# Patient Record
Sex: MALE | Race: WHITE | NOT HISPANIC OR LATINO | Employment: OTHER | URBAN - METROPOLITAN AREA
[De-identification: names, ages, dates, MRNs, and addresses within clinical notes are randomized per-mention and may not be internally consistent; named-entity substitution may affect disease eponyms.]

---

## 2017-02-21 DIAGNOSIS — I10 BENIGN ESSENTIAL HYPERTENSION: ICD-10-CM

## 2017-02-21 RX ORDER — ACEBUTOLOL HYDROCHLORIDE 200 MG/1
200 CAPSULE ORAL EVERY 12 HOURS
Qty: 30 CAPSULE | Refills: 0 | Status: SHIPPED | OUTPATIENT
Start: 2017-02-21 | End: 2017-05-16 | Stop reason: SDUPTHER

## 2017-05-12 DIAGNOSIS — R53.81 MALAISE AND FATIGUE: ICD-10-CM

## 2017-05-12 DIAGNOSIS — Z00.00 HEALTHCARE MAINTENANCE: Primary | ICD-10-CM

## 2017-05-12 DIAGNOSIS — R53.83 MALAISE AND FATIGUE: ICD-10-CM

## 2017-05-12 DIAGNOSIS — E78.5 HYPERLIPIDEMIA, UNSPECIFIED HYPERLIPIDEMIA TYPE: ICD-10-CM

## 2017-05-12 LAB
ALBUMIN SERPL-MCNC: 4.7 G/DL (ref 3.5–5.2)
ALBUMIN/GLOB SERPL: 1.8 G/DL
ALP SERPL-CCNC: 83 U/L (ref 39–117)
ALT SERPL-CCNC: 49 U/L (ref 1–41)
AST SERPL-CCNC: 26 U/L (ref 1–40)
BASOPHILS # BLD AUTO: 0.03 10*3/MM3 (ref 0–0.2)
BASOPHILS NFR BLD AUTO: 0.5 % (ref 0–1.5)
BILIRUB SERPL-MCNC: 1.4 MG/DL (ref 0.1–1.2)
BUN SERPL-MCNC: 18 MG/DL (ref 8–23)
BUN/CREAT SERPL: 18 (ref 7–25)
CALCIUM SERPL-MCNC: 10.1 MG/DL (ref 8.6–10.5)
CHLORIDE SERPL-SCNC: 100 MMOL/L (ref 98–107)
CHOLEST SERPL-MCNC: 197 MG/DL (ref 0–200)
CO2 SERPL-SCNC: 29.1 MMOL/L (ref 22–29)
CREAT SERPL-MCNC: 1 MG/DL (ref 0.76–1.27)
EOSINOPHIL # BLD AUTO: 0.17 10*3/MM3 (ref 0–0.7)
EOSINOPHIL NFR BLD AUTO: 2.6 % (ref 0.3–6.2)
ERYTHROCYTE [DISTWIDTH] IN BLOOD BY AUTOMATED COUNT: 14.6 % (ref 11.5–14.5)
GLOBULIN SER CALC-MCNC: 2.6 GM/DL
GLUCOSE SERPL-MCNC: 101 MG/DL (ref 65–99)
HCT VFR BLD AUTO: 46.9 % (ref 40.4–52.2)
HDLC SERPL-MCNC: 40 MG/DL (ref 40–60)
HGB BLD-MCNC: 16.2 G/DL (ref 13.7–17.6)
IMM GRANULOCYTES # BLD: 0.05 10*3/MM3 (ref 0–0.03)
IMM GRANULOCYTES NFR BLD: 0.8 % (ref 0–0.5)
LDLC SERPL CALC-MCNC: 143 MG/DL (ref 0–100)
LDLC/HDLC SERPL: 3.58 {RATIO}
LYMPHOCYTES # BLD AUTO: 1.36 10*3/MM3 (ref 0.9–4.8)
LYMPHOCYTES NFR BLD AUTO: 20.6 % (ref 19.6–45.3)
MCH RBC QN AUTO: 32.2 PG (ref 27–32.7)
MCHC RBC AUTO-ENTMCNC: 34.5 G/DL (ref 32.6–36.4)
MCV RBC AUTO: 93.2 FL (ref 79.8–96.2)
MONOCYTES # BLD AUTO: 0.53 10*3/MM3 (ref 0.2–1.2)
MONOCYTES NFR BLD AUTO: 8 % (ref 5–12)
NEUTROPHILS # BLD AUTO: 4.47 10*3/MM3 (ref 1.9–8.1)
NEUTROPHILS NFR BLD AUTO: 67.5 % (ref 42.7–76)
PLATELET # BLD AUTO: 266 10*3/MM3 (ref 140–500)
POTASSIUM SERPL-SCNC: 4.2 MMOL/L (ref 3.5–5.2)
PROT SERPL-MCNC: 7.3 G/DL (ref 6–8.5)
RBC # BLD AUTO: 5.03 10*6/MM3 (ref 4.6–6)
SODIUM SERPL-SCNC: 142 MMOL/L (ref 136–145)
TRIGL SERPL-MCNC: 70 MG/DL (ref 0–150)
TSH SERPL DL<=0.005 MIU/L-ACNC: 2.6 MIU/ML (ref 0.27–4.2)
VLDLC SERPL CALC-MCNC: 14 MG/DL (ref 5–40)
WBC # BLD AUTO: 6.61 10*3/MM3 (ref 4.5–10.7)

## 2017-05-16 ENCOUNTER — OFFICE VISIT (OUTPATIENT)
Dept: FAMILY MEDICINE CLINIC | Facility: CLINIC | Age: 62
End: 2017-05-16

## 2017-05-16 VITALS
SYSTOLIC BLOOD PRESSURE: 126 MMHG | HEART RATE: 63 BPM | BODY MASS INDEX: 24.38 KG/M2 | WEIGHT: 180 LBS | DIASTOLIC BLOOD PRESSURE: 82 MMHG | OXYGEN SATURATION: 99 % | HEIGHT: 72 IN

## 2017-05-16 DIAGNOSIS — G47.09 OTHER INSOMNIA: ICD-10-CM

## 2017-05-16 DIAGNOSIS — I10 BENIGN ESSENTIAL HYPERTENSION: ICD-10-CM

## 2017-05-16 DIAGNOSIS — Z00.00 HEALTHCARE MAINTENANCE: Primary | ICD-10-CM

## 2017-05-16 DIAGNOSIS — B35.1 ONYCHOMYCOSIS: ICD-10-CM

## 2017-05-16 DIAGNOSIS — M25.832 ULNAR ABUTMENT SYNDROME OF LEFT WRIST: ICD-10-CM

## 2017-05-16 DIAGNOSIS — E78.2 MIXED HYPERLIPIDEMIA: ICD-10-CM

## 2017-05-16 PROCEDURE — 99396 PREV VISIT EST AGE 40-64: CPT | Performed by: FAMILY MEDICINE

## 2017-05-16 RX ORDER — INDAPAMIDE 1.25 MG/1
1.25 TABLET, FILM COATED ORAL DAILY
Qty: 90 TABLET | Refills: 1 | Status: SHIPPED | OUTPATIENT
Start: 2017-05-16 | End: 2017-08-15 | Stop reason: SDUPTHER

## 2017-05-16 RX ORDER — ACEBUTOLOL HYDROCHLORIDE 200 MG/1
200 CAPSULE ORAL EVERY 12 HOURS
Qty: 90 CAPSULE | Refills: 3 | Status: SHIPPED | OUTPATIENT
Start: 2017-05-16 | End: 2017-08-15 | Stop reason: SDUPTHER

## 2017-05-16 RX ORDER — METAXALONE 800 MG/1
800 TABLET ORAL 3 TIMES DAILY
Qty: 90 TABLET | Refills: 3 | Status: SHIPPED | OUTPATIENT
Start: 2017-05-16 | End: 2019-02-11 | Stop reason: SDUPTHER

## 2017-05-17 LAB
Lab: NORMAL
PSA SERPL-MCNC: 0.99 NG/ML (ref 0–4)
SPECIMEN STATUS: NORMAL

## 2017-05-18 LAB — HCV AB S/CO SERPL IA: <0.1 S/CO RATIO (ref 0–0.9)

## 2017-05-23 ENCOUNTER — RESULTS ENCOUNTER (OUTPATIENT)
Dept: FAMILY MEDICINE CLINIC | Facility: CLINIC | Age: 62
End: 2017-05-23

## 2017-05-23 DIAGNOSIS — E78.2 MIXED HYPERLIPIDEMIA: ICD-10-CM

## 2017-08-11 LAB
CHOLEST SERPL-MCNC: 174 MG/DL (ref 0–200)
HDLC SERPL-MCNC: 43 MG/DL (ref 40–60)
LDLC SERPL CALC-MCNC: 119 MG/DL (ref 0–100)
LDLC/HDLC SERPL: 2.76 {RATIO}
TRIGL SERPL-MCNC: 62 MG/DL (ref 0–150)
VLDLC SERPL CALC-MCNC: 12.4 MG/DL (ref 5–40)

## 2017-08-15 ENCOUNTER — OFFICE VISIT (OUTPATIENT)
Dept: FAMILY MEDICINE CLINIC | Facility: CLINIC | Age: 62
End: 2017-08-15

## 2017-08-15 VITALS
BODY MASS INDEX: 22.75 KG/M2 | HEART RATE: 65 BPM | SYSTOLIC BLOOD PRESSURE: 132 MMHG | WEIGHT: 168 LBS | HEIGHT: 72 IN | OXYGEN SATURATION: 99 % | DIASTOLIC BLOOD PRESSURE: 86 MMHG

## 2017-08-15 DIAGNOSIS — E78.2 MIXED HYPERLIPIDEMIA: ICD-10-CM

## 2017-08-15 DIAGNOSIS — I10 BENIGN ESSENTIAL HYPERTENSION: Primary | ICD-10-CM

## 2017-08-15 PROCEDURE — 99213 OFFICE O/P EST LOW 20 MIN: CPT | Performed by: FAMILY MEDICINE

## 2017-08-15 RX ORDER — INDAPAMIDE 1.25 MG/1
1.25 TABLET, FILM COATED ORAL DAILY
Qty: 90 TABLET | Refills: 1 | Status: SHIPPED | OUTPATIENT
Start: 2017-08-15 | End: 2019-01-07 | Stop reason: SDUPTHER

## 2017-08-15 RX ORDER — ACEBUTOLOL HYDROCHLORIDE 200 MG/1
200 CAPSULE ORAL EVERY 12 HOURS
Qty: 90 CAPSULE | Refills: 3 | Status: SHIPPED | OUTPATIENT
Start: 2017-08-15 | End: 2019-04-01 | Stop reason: SDUPTHER

## 2017-08-15 NOTE — PROGRESS NOTES
Emery Kincaid is a 61 y.o. male.  Seen 08/15/2017    Assessment/Plan   Problem List Items Addressed This Visit        Cardiovascular and Mediastinum    Hyperlipidemia    Overview     [facility] 8/15/2017  He has done a great job of getting his BP and lipids under control. [facility] 5/16/2017  Patient LDL has gone up and HDL has decreased . He really would prefer to avoid a statin but his CAD/CVA risk is 22% over the next ten years. He will increase his dietary changes and exercise more and recheck in three months.          Benign essential hypertension - Primary    Overview     [facility] 08/15/17 BP is controlled well. He will recheck in six months. He will continue present meds.           Relevant Medications    acebutolol (SECTRAL) 200 MG capsule    indapamide (LOZOL) 1.25 MG tablet             Return in about 6 months (around 2/15/2018).  Patient Instructions   Agree with waiting on surgery      Subjective     Chief Complaint   Patient presents with   • Hyperlipidemia     discuss labs      Social History   Substance Use Topics   • Smoking status: Never Smoker   • Smokeless tobacco: Never Used   • Alcohol use Yes      Comment: 1-2 per day       History of Present Illness     Patient is here for follow-up of hypertension. He is exercising and is adherent to a low-salt diet. Patient does BP checks at home: It is well controlled when checked. and home BP readings are in the 110's/70's range. Patient denies chest pain and dyspnea. Cardiovascular risk factors: advanced age (older than 55 for men, 65 for women), dyslipidemia, hypertension and male gender. Use of agents associated with hypertension: none. History of target organ damage: none. He is compliant with meds.      He also continues to have pain and numbness in his left arm. He has been dx'd with cubital impingement and surgery recommended but he would like to try some exercising and wait on that.     The following portions of the patient's history were reviewed  "and updated as appropriate:PMHroutine: Social history , Allergies, Current Medications, Active Problem List and Health Maintenance    Review of Systems   Constitutional: Negative for activity change, appetite change, chills, fatigue, fever and unexpected weight change.   HENT: Negative for congestion, ear pain, hearing loss, mouth sores, nosebleeds, rhinorrhea and sore throat.    Eyes: Negative for pain and visual disturbance.   Respiratory: Negative for cough, shortness of breath and wheezing.    Cardiovascular: Negative for chest pain, palpitations and leg swelling.   Gastrointestinal: Negative for abdominal distention, abdominal pain, blood in stool, constipation, diarrhea, nausea and vomiting.   Endocrine: Negative for cold intolerance and heat intolerance.   Genitourinary: Negative for difficulty urinating, discharge, dysuria, frequency, hematuria and urgency.   Musculoskeletal: Negative for back pain and joint swelling.   Skin: Negative for rash and wound.   Neurological: Positive for numbness. Negative for dizziness, weakness and headaches.   Hematological: Does not bruise/bleed easily.   Psychiatric/Behavioral: Negative for confusion, dysphoric mood, sleep disturbance and suicidal ideas. The patient is not nervous/anxious.        Objective   Vitals:    08/15/17 1054   BP: 132/86   Pulse: 65   SpO2: 99%   Weight: 168 lb (76.2 kg)   Height: 71.5\" (181.6 cm)     Body mass index is 23.1 kg/(m^2).  Physical Exam   Constitutional: He is oriented to person, place, and time. Vital signs are normal. He appears well-developed and well-nourished. No distress.   HENT:   Head: Normocephalic.   Cardiovascular: Normal rate, regular rhythm and normal heart sounds.    Pulmonary/Chest: Effort normal and breath sounds normal.   Neurological: He is alert and oriented to person, place, and time. Gait normal.   Psychiatric: He has a normal mood and affect. His behavior is normal. Judgment and thought content normal.   Vitals " reviewed.    Reviewed Data:  No visits with results within 1 Month(s) from this visit.  Latest known visit with results is:    Results Encounter on 05/23/2017   Component Date Value Ref Range Status   • Total Cholesterol 08/11/2017 174  0 - 200 mg/dL Final   • Triglycerides 08/11/2017 62  0 - 150 mg/dL Final   • HDL Cholesterol 08/11/2017 43  40 - 60 mg/dL Final   • VLDL Cholesterol 08/11/2017 12.4  5 - 40 mg/dL Final   • LDL Cholesterol  08/11/2017 119* 0 - 100 mg/dL Final   • LDL/HDL Ratio 08/11/2017 2.76   Final

## 2018-04-19 ENCOUNTER — CLINICAL SUPPORT (OUTPATIENT)
Dept: FAMILY MEDICINE CLINIC | Facility: CLINIC | Age: 63
End: 2018-04-19

## 2018-04-19 DIAGNOSIS — Z23 NEED FOR VACCINATION: Primary | ICD-10-CM

## 2018-04-19 PROCEDURE — 90632 HEPA VACCINE ADULT IM: CPT | Performed by: FAMILY MEDICINE

## 2018-04-19 PROCEDURE — 90471 IMMUNIZATION ADMIN: CPT | Performed by: FAMILY MEDICINE

## 2018-05-05 DIAGNOSIS — I10 BENIGN ESSENTIAL HYPERTENSION: ICD-10-CM

## 2018-05-07 RX ORDER — ACEBUTOLOL HYDROCHLORIDE 200 MG/1
CAPSULE ORAL
Qty: 90 CAPSULE | Refills: 0 | Status: SHIPPED | OUTPATIENT
Start: 2018-05-07 | End: 2019-01-04 | Stop reason: SDUPTHER

## 2018-06-29 ENCOUNTER — OFFICE VISIT (OUTPATIENT)
Dept: RETAIL CLINIC | Facility: CLINIC | Age: 63
End: 2018-06-29

## 2018-06-29 VITALS
OXYGEN SATURATION: 97 % | HEART RATE: 85 BPM | SYSTOLIC BLOOD PRESSURE: 130 MMHG | DIASTOLIC BLOOD PRESSURE: 84 MMHG | RESPIRATION RATE: 18 BRPM | TEMPERATURE: 97 F
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
## 2018-06-29 DIAGNOSIS — A69.20 ERYTHEMA MIGRANS (LYME DISEASE): Primary | ICD-10-CM

## 2018-06-29 PROCEDURE — 99214 OFFICE O/P EST MOD 30 MIN: CPT | Performed by: NURSE PRACTITIONER

## 2018-06-29 RX ORDER — DOXYCYCLINE 100 MG/1
100 CAPSULE ORAL EVERY 12 HOURS SCHEDULED
Qty: 20 CAPSULE | Refills: 0 | Status: SHIPPED | OUTPATIENT
Start: 2018-06-29 | End: 2018-07-09

## 2018-06-29 NOTE — PROGRESS NOTES
Subjective   Patient ID: Emery Kincaid is a 62 y.o. male presents with   Chief Complaint   Patient presents with   • Tick Removal       Tick Removal   This is a new problem. The current episode started in the past 7 days ((2 days ago) Patient reports that he scratched his right inner thigh upon waking up and felt a bump. When he looked down he saw that a tick had fallen off. He took a picture with his phone and presented what appears to be a tick to me.). The problem occurs constantly. The problem has been gradually worsening. Associated symptoms include a rash (right thigh). Nothing aggravates the symptoms. Treatments tried: alcohol. The treatment provided no relief.       No Known Allergies    The following portions of the patient's history were reviewed and updated as appropriate: allergies, current medications, past family history, past medical history, past social history, past surgical history and problem list.      Review of Systems   Constitutional: Negative.    HENT: Negative.    Respiratory: Negative.    Cardiovascular: Negative.    Gastrointestinal: Negative.    Musculoskeletal: Negative.    Skin: Positive for rash (right thigh).   Neurological: Negative.        Objective     Vitals:    06/29/18 1103   BP: 130/84   Pulse: 85   Resp: 18   Temp: 97 °F (36.1 °C)   SpO2: 97%         Physical Exam   Constitutional: He is oriented to person, place, and time. He appears well-developed and well-nourished. He does not appear ill. No distress.   HENT:   Head: Normocephalic.   Right Ear: Hearing and external ear normal.   Left Ear: Hearing and external ear normal.   Nose: Nose normal.   Mouth/Throat: Mucous membranes are normal.   Eyes: Conjunctivae are normal.   Sclera white.   Neck: No tracheal deviation present.   Cardiovascular: Normal rate, regular rhythm, S1 normal, S2 normal and normal heart sounds.    Pulmonary/Chest: Effort normal and breath sounds normal. No accessory muscle usage. No respiratory  distress.   Abdominal: Soft. Bowel sounds are normal. There is no tenderness.   Lymphadenopathy:     He has no cervical adenopathy.   Neurological: He is alert and oriented to person, place, and time.   Skin: Skin is warm and dry. There is erythema.        Bulls eye rash to right upper medial thigh. Center with tan scab intact. Mild tenderness with palpation.   Vitals reviewed.        Emery was seen today for tick removal.    Diagnoses and all orders for this visit:    Erythema migrans (Lyme disease)  -     doxycycline (MONODOX) 100 MG capsule; Take 1 capsule by mouth Every 12 (Twelve) Hours for 10 days.    Tick bite, initial encounter        Follow-up with Primary Care Physician in 48-72 hours if these symptoms worsen or fail to improve as anticipated. Patient verbalizes understanding.    Lyme Disease  Lyme disease is an infection that affects many parts of the body, including the skin, joints, and nervous system. It is a bacterial infection that starts from the bite of an infected tick. The infection can spread, and some of the symptoms are similar to the flu. If Lyme disease is not treated, it may cause joint pain, swelling, numbness, problems thinking, fatigue, muscle weakness, and other problems.  What are the causes?  This condition is caused by bacteria called Borrelia burgdorferi.  You can get Lyme disease by being bitten by an infected tick. The tick must be attached to your skin to pass along the infection. [place] often carry infected ticks.  What increases the risk?  The following factors may make you more likely to develop this condition:  · Living in or visiting these areas in the [place]  ? [place].  ? The [place].  ? [place].  · Spending time in wooded or grassy areas.  · Being outdoors with exposed skin.  · Camping, gardening, hiking, fishing, or hunting outdoors.  · Failing to remove a tick from your skin within 3-4 days.    What are the signs or symptoms?  Symptoms of this  condition include:  · A round, red rash that surrounds the center of the tick bite. This is the first sign of infection. The center of the rash may be blood colored or have tiny blisters.  · Fatigue.  · Headache.  · Chills and fever.  · General achiness.  · Joint pain, often in the knees.  · Muscle pain.  · Swollen lymph glands.  · Stiff neck.    How is this diagnosed?  This condition is diagnosed based on:  · Your symptoms and medical history.  · A physical exam.  · A blood test.    How is this treated?  The main treatment for this condition is antibiotic medicine, which is usually taken by mouth (orally). The length of treatment depends on how soon after a tick bite you begin taking the medicine. In some cases, treatment is necessary for several weeks. If the infection is severe, antibiotics may need to be given through an IV tube that is inserted into one of your veins.  Follow these instructions at home:  · Take your antibiotic medicine as told by your health care provider. Do not stop taking the antibiotic even if you start to feel better.  · Ask your health care provider about taking a probiotic in between doses of your antibiotic to help avoid stomach upset or diarrhea.  · Check with your health care provider before supplementing your treatment. Many alternative therapies have not been proven and may be harmful to you.  · Keep all follow-up visits as told by your health care provider. This is important.  How is this prevented?  You can become reinfected if you get another tick bite from an infected tick. Take these steps to help prevent an infection:  · Cover your skin with light-colored clothing when you are outdoors in the spring and summer months.  · Spray clothing and skin with bug spray. The spray should be 20-30% DEET.  · Avoid wooded, grassy, and shaded areas.  · Remove yard litter, brush, trash, and plants that attract deer and rodents.  · Check yourself for ticks when you come indoors.  · Wash  clothing worn each day.  · Check your pets for ticks before they come inside.  · If you find a tick:  ? Remove it with tweezers.  ? Clean your hands and the bite area with rubbing alcohol or soap and water.    Pregnant women should take special care to avoid tick bites because the infection can be passed along to the fetus.  Contact a health care provider if:  · You have symptoms after treatment.  · You have removed a tick and want to bring it to your health care provider for testing.  Get help right away if:  · You have an irregular heartbeat.  · You have nerve pain.  · Your face feels numb.  This information is not intended to replace advice given to you by your health care provider. Make sure you discuss any questions you have with your health care provider.  Document Released: 03/26/2002 Document Revised: 08/08/2017 Document Reviewed: 08/08/2017  [employer] Interactive Patient Education © 2018 [employer]  Tick Bite Information, Adult  Ticks are insects that draw blood for food. Most ticks live in shrubs and grassy areas. They climb onto people and animals that brush against the leaves and grasses that they rest on. Then they bite, attaching themselves to the skin.  Most ticks are harmless, but some ticks carry germs that can spread to a person through a bite and cause a disease. To reduce your risk of getting a disease from a tick bite, it is important to take steps to prevent tick bites. It is also important to check for ticks after being outdoors. If you find that a tick has attached to you, watch for symptoms of disease.  How can I prevent tick bites?  Take these steps to help prevent tick bites when you are outdoors in an area where ticks are found:  · Use insect repellent that has DEET (20% or higher), picaridin, or [id number] in it. Use it on:  ? Skin that is showing.  ? The top of your boots.  ? Your pant legs.  ? Your sleeve cuffs.  · For repellent products that contain permethrin, follow product  instructions. Use these products on:  ? Clothing.  ? Gear.  ? Boots.  ? Tents.  · Wear protective clothing. Long sleeves and long pants offer the best protection from ticks.  · Wear light-colored clothing so you can see ticks more easily.  · Tuck your pant legs into your socks.  · If you go walking on a trail, stay in the middle of the trail so your skin, hair, and clothing do not touch the bushes.  · Avoid walking through areas with long grass.  · Check for ticks on your clothing, hair, and skin often while you are outside, and check again before you go inside. Make sure to check the places that ticks attach themselves most often. These places include the scalp, neck, armpits, waist, groin, and joint areas. Ticks that carry a disease called Lyme disease have to be attached to the skin for 24-48 hours. Checking for ticks every day will lessen your risk of this and other diseases.  · When you come indoors, wash your clothes and take a shower or a bath right away. Dry your clothes in a dryer on high heat for at least 60 minutes. This will kill any ticks in your clothes.    What is the proper way to remove a tick?  If you find a tick on your body, remove it as soon as possible. Removing a tick sooner rather than later can prevent germs from passing from the tick to your body. To remove a tick that is crawling on your skin but has not bitten:  · Go outdoors and brush the tick off.  · Remove the tick with tape or a lint roller.    To remove a tick that is attached to your skin:  · Wash your hands.  · If you have latex gloves, put them on.  · Use tweezers, curved forceps, or a tick-removal tool to gently grasp the tick as close to your skin and the tick's head as possible.  · Gently pull with steady, upward pressure until the tick lets go. When removing the tick:  ? Take care to keep the tick's head attached to its body.  ? Do not twist or jerk the tick. This can make the tick's head or mouth break off.  ? Do not squeeze  "or crush the tick's body. This could force disease-carrying fluids from the tick into your body.    Do not try to remove a tick with heat, alcohol, petroleum jelly, or fingernail polish. Using these methods can cause the tick to salivate and regurgitate into your bloodstream, increasing your risk of getting a disease.  What should I do after removing a tick?  · Clean the bite area with soap and water, rubbing alcohol, or an iodine scrub.  · If an antiseptic cream or ointment is available, apply a small amount to the bite site.  · Wash and disinfect any instruments that you used to remove the tick.  How should I dispose of a tick?  To dispose of a live tick, use one of these methods:  · Place it in rubbing alcohol.  · Place it in a sealed bag or container.  · Wrap it tightly in tape.  · Flush it down the toilet.    Contact a health care provider if:  · You have symptoms of a disease after a tick bite. Symptoms of a tick-borne disease can occur from moments after the tick bites to up to 30 days after a tick is removed. Symptoms include:  ? Muscle, joint, or bone pain.  ? Difficulty walking or moving your legs.  ? Numbness in the legs.  ? Paralysis.  ? Red rash around the tick bite area that is shaped like a target or a \"bull's-eye.\"  ? Redness and swelling in the area of the tick bite.  ? Fever.  ? Repeated vomiting.  ? Diarrhea.  ? Weight loss.  ? Tender, swollen lymph glands.  ? Shortness of breath.  ? Cough.  ? Pain in the abdomen.  ? Headache.  ? Abnormal tiredness.  ? A change in your level of consciousness.  ? Confusion.  Get help right away if:  · You are not able to remove a tick.  · A part of a tick breaks off and gets stuck in your skin.  · Your symptoms get worse.  Summary  · Ticks may carry germs that can spread to a person through a bite and cause disease.  · Wear protective clothing and use insect repellent to prevent tick bites. Follow product instructions.  · If you find a tick on your body, remove it " as soon as possible. If the tick is attached, do not try to remove with heat, alcohol, petroleum jelly, or fingernail polish.  · Remove the attached tick using tweezers, curved forceps, or a tick-removal tool. Gently pull with steady, upward pressure until the tick lets go. Do not twist or jerk the tick. Do not squeeze or crush the tick's body.  · If you have symptoms after being bitten by a tick, contact a health care provider.  This information is not intended to replace advice given to you by your health care provider. Make sure you discuss any questions you have with your health care provider.  Document Released: 12/15/2001 Document Revised: 09/29/2017 Document Reviewed: 09/29/2017  [employer] Interactive Patient Education © 2018 [employer]  Doxycycline tablets or capsules  What is this medicine?  DOXYCYCLINE (dox ashia PASTOR gaston) is a tetracycline antibiotic. It kills certain bacteria or stops their growth. It is used to treat many kinds of infections, like dental, skin, respiratory, and urinary tract infections. It also treats acne, Lyme disease, malaria, and certain sexually transmitted infections.  This medicine may be used for other purposes; ask your health care provider or pharmacist if you have questions.  COMMON BRAND NAME(S): Acticlate, Adoxa, Adoxa CK, Adoxa K[place], Adoxa TT, Alodox, Avidoxy, Doxal, Mondoxyne NL, Monodox, Morgidox 1x, Morgidox 1x Kit, Morgidox 2x, Morgidox 2x Kit, NutriDox, Ocudox, TARGADOX, Vibra-Tabs, Vibramycin  What should I tell my health care provider before I take this medicine?  They need to know if you have any of these conditions:  -liver disease  -long exposure to sunlight like working outdoors  -stomach problems like colitis  -an unusual or allergic reaction to doxycycline, tetracycline antibiotics, other medicines, foods, dyes, or preservatives  -pregnant or trying to get pregnant  -breast-feeding  How should I use this medicine?  Take this medicine by mouth with a full glass  of water. Follow the directions on the prescription label. It is best to take this medicine without food, but if it upsets your stomach take it with food. Take your medicine at regular intervals. Do not take your medicine more often than directed. Take all of your medicine as directed even if you think you are better. Do not skip doses or stop your medicine early.  Talk to your pediatrician regarding the use of this medicine in children. While this drug may be prescribed for selected conditions, precautions do apply.  Overdosage: If you think you have taken too much of this medicine contact a poison control center or emergency room at once.  NOTE: This medicine is only for you. Do not share this medicine with others.  What if I miss a dose?  If you miss a dose, take it as soon as you can. If it is almost time for your next dose, take only that dose. Do not take double or extra doses.  What may interact with this medicine?  -antacids  -barbiturates  -birth control pills  -bismuth subsalicylate  -carbamazepine  -methoxyflurane  -other antibiotics  -phenytoin  -vitamins that contain iron  -warfarin  This list may not describe all possible interactions. Give your health care provider a list of all the medicines, herbs, non-prescription drugs, or dietary supplements you use. Also tell them if you smoke, drink alcohol, or use illegal drugs. Some items may interact with your medicine.  What should I watch for while using this medicine?  Tell your doctor or health care professional if your symptoms do not improve.  Do not treat diarrhea with over the counter products. Contact your doctor if you have diarrhea that lasts more than 2 days or if it is severe and watery.  Do not take this medicine just before going to bed. It may not dissolve properly when you lay down and can cause pain in your throat. Drink plenty of fluids while taking this medicine to also help reduce irritation in your throat.  This medicine can make you  more sensitive to the sun. Keep out of the sun. If you cannot avoid being in the sun, wear protective clothing and use sunscreen. Do not use sun lamps or tanning beds/booths.  Birth control pills may not work properly while you are taking this medicine. Talk to your doctor about using an extra method of birth control.  If you are being treated for a sexually transmitted infection, avoid sexual contact until you have finished your treatment. Your sexual partner may also need treatment.  Avoid antacids, aluminum, calcium, magnesium, and iron products for 4 hours before and 2 hours after taking a dose of this medicine.  If you are using this medicine to prevent malaria, you should still protect yourself from contact with mosquitos. Stay in screened-in areas, use mosquito nets, keep your body covered, and use an insect repellent.  What side effects may I notice from receiving this medicine?  Side effects that you should report to your doctor or health care professional as soon as possible:  -allergic reactions like skin rash, itching or hives, swelling of the face, lips, or tongue  -difficulty breathing  -fever  -itching in the rectal or genital area  -pain on swallowing  -redness, blistering, peeling or loosening of the skin, including inside the mouth  -severe stomach pain or cramps  -unusual bleeding or bruising  -unusually weak or tired  -yellowing of the eyes or skin  Side effects that usually do not require medical attention (report to your doctor or health care professional if they continue or are bothersome):  -diarrhea  -loss of appetite  -nausea, vomiting  This list may not describe all possible side effects. Call your doctor for medical advice about side effects. You may report side effects to FDA at 3-043-FDA-9335.  Where should I keep my medicine?  Keep out of the reach of children.  Store at room temperature, below 30 degrees C (86 degrees F). Protect from light. Keep container tightly closed. Throw away  any unused medicine after the expiration date. Taking this medicine after the expiration date can make you seriously ill.  NOTE: This sheet is a summary. It may not cover all possible information. If you have questions about this medicine, talk to your doctor, pharmacist, or health care provider.  © 2018 [employer] (2017-01-18 17:11:22)

## 2018-06-29 NOTE — PATIENT INSTRUCTIONS
Lyme Disease  Lyme disease is an infection that affects many parts of the body, including the skin, joints, and nervous system. It is a bacterial infection that starts from the bite of an infected tick. The infection can spread, and some of the symptoms are similar to the flu. If Lyme disease is not treated, it may cause joint pain, swelling, numbness, problems thinking, fatigue, muscle weakness, and other problems.  What are the causes?  This condition is caused by bacteria called Borrelia burgdorferi.  You can get Lyme disease by being bitten by an infected tick. The tick must be attached to your skin to pass along the infection. [place] often carry infected ticks.  What increases the risk?  The following factors may make you more likely to develop this condition:  · Living in or visiting these areas in the [place]  ? [place].  ? [place].  ? [place].  · Spending time in wooded or grassy areas.  · Being outdoors with exposed skin.  · Camping, gardening, hiking, fishing, or hunting outdoors.  · Failing to remove a tick from your skin within 3-4 days.    What are the signs or symptoms?  Symptoms of this condition include:  · A round, red rash that surrounds the center of the tick bite. This is the first sign of infection. The center of the rash may be blood colored or have tiny blisters.  · Fatigue.  · Headache.  · Chills and fever.  · General achiness.  · Joint pain, often in the knees.  · Muscle pain.  · Swollen lymph glands.  · Stiff neck.    How is this diagnosed?  This condition is diagnosed based on:  · Your symptoms and medical history.  · A physical exam.  · A blood test.    How is this treated?  The main treatment for this condition is antibiotic medicine, which is usually taken by mouth (orally). The length of treatment depends on how soon after a tick bite you begin taking the medicine. In some cases, treatment is necessary for several weeks. If the infection is severe, antibiotics  may need to be given through an IV tube that is inserted into one of your veins.  Follow these instructions at home:  · Take your antibiotic medicine as told by your health care provider. Do not stop taking the antibiotic even if you start to feel better.  · Ask your health care provider about taking a probiotic in between doses of your antibiotic to help avoid stomach upset or diarrhea.  · Check with your health care provider before supplementing your treatment. Many alternative therapies have not been proven and may be harmful to you.  · Keep all follow-up visits as told by your health care provider. This is important.  How is this prevented?  You can become reinfected if you get another tick bite from an infected tick. Take these steps to help prevent an infection:  · Cover your skin with light-colored clothing when you are outdoors in the spring and summer months.  · Spray clothing and skin with bug spray. The spray should be 20-30% DEET.  · Avoid wooded, grassy, and shaded areas.  · Remove yard litter, brush, trash, and plants that attract deer and rodents.  · Check yourself for ticks when you come indoors.  · Wash clothing worn each day.  · Check your pets for ticks before they come inside.  · If you find a tick:  ? Remove it with tweezers.  ? Clean your hands and the bite area with rubbing alcohol or soap and water.    Pregnant women should take special care to avoid tick bites because the infection can be passed along to the fetus.  Contact a health care provider if:  · You have symptoms after treatment.  · You have removed a tick and want to bring it to your health care provider for testing.  Get help right away if:  · You have an irregular heartbeat.  · You have nerve pain.  · Your face feels numb.  This information is not intended to replace advice given to you by your health care provider. Make sure you discuss any questions you have with your health care provider.  Document Released: 03/26/2002 Document  Revised: 08/08/2017 Document Reviewed: 08/08/2017  [employer] Interactive Patient Education © 2018 [employer]  Tick Bite Information, Adult  Ticks are insects that draw blood for food. Most ticks live in shrubs and grassy areas. They climb onto people and animals that brush against the leaves and grasses that they rest on. Then they bite, attaching themselves to the skin.  Most ticks are harmless, but some ticks carry germs that can spread to a person through a bite and cause a disease. To reduce your risk of getting a disease from a tick bite, it is important to take steps to prevent tick bites. It is also important to check for ticks after being outdoors. If you find that a tick has attached to you, watch for symptoms of disease.  How can I prevent tick bites?  Take these steps to help prevent tick bites when you are outdoors in an area where ticks are found:  · Use insect repellent that has DEET (20% or higher), picaridin, or [id number] in it. Use it on:  ? Skin that is showing.  ? The top of your boots.  ? Your pant legs.  ? Your sleeve cuffs.  · For repellent products that contain permethrin, follow product instructions. Use these products on:  ? Clothing.  ? Gear.  ? Boots.  ? Tents.  · Wear protective clothing. Long sleeves and long pants offer the best protection from ticks.  · Wear light-colored clothing so you can see ticks more easily.  · Tuck your pant legs into your socks.  · If you go walking on a trail, stay in the middle of the trail so your skin, hair, and clothing do not touch the bushes.  · Avoid walking through areas with long grass.  · Check for ticks on your clothing, hair, and skin often while you are outside, and check again before you go inside. Make sure to check the places that ticks attach themselves most often. These places include the scalp, neck, armpits, waist, groin, and joint areas. Ticks that carry a disease called Lyme disease have to be attached to the skin for 24-48 hours. Checking  for ticks every day will lessen your risk of this and other diseases.  · When you come indoors, wash your clothes and take a shower or a bath right away. Dry your clothes in a dryer on high heat for at least 60 minutes. This will kill any ticks in your clothes.    What is the proper way to remove a tick?  If you find a tick on your body, remove it as soon as possible. Removing a tick sooner rather than later can prevent germs from passing from the tick to your body. To remove a tick that is crawling on your skin but has not bitten:  · Go outdoors and brush the tick off.  · Remove the tick with tape or a lint roller.    To remove a tick that is attached to your skin:  · Wash your hands.  · If you have latex gloves, put them on.  · Use tweezers, curved forceps, or a tick-removal tool to gently grasp the tick as close to your skin and the tick's head as possible.  · Gently pull with steady, upward pressure until the tick lets go. When removing the tick:  ? Take care to keep the tick's head attached to its body.  ? Do not twist or jerk the tick. This can make the tick's head or mouth break off.  ? Do not squeeze or crush the tick's body. This could force disease-carrying fluids from the tick into your body.    Do not try to remove a tick with heat, alcohol, petroleum jelly, or fingernail polish. Using these methods can cause the tick to salivate and regurgitate into your bloodstream, increasing your risk of getting a disease.  What should I do after removing a tick?  · Clean the bite area with soap and water, rubbing alcohol, or an iodine scrub.  · If an antiseptic cream or ointment is available, apply a small amount to the bite site.  · Wash and disinfect any instruments that you used to remove the tick.  How should I dispose of a tick?  To dispose of a live tick, use one of these methods:  · Place it in rubbing alcohol.  · Place it in a sealed bag or container.  · Wrap it tightly in tape.  · Flush it down the  "toilet.    Contact a health care provider if:  · You have symptoms of a disease after a tick bite. Symptoms of a tick-borne disease can occur from moments after the tick bites to up to 30 days after a tick is removed. Symptoms include:  ? Muscle, joint, or bone pain.  ? Difficulty walking or moving your legs.  ? Numbness in the legs.  ? Paralysis.  ? Red rash around the tick bite area that is shaped like a target or a \"bull's-eye.\"  ? Redness and swelling in the area of the tick bite.  ? Fever.  ? Repeated vomiting.  ? Diarrhea.  ? Weight loss.  ? Tender, swollen lymph glands.  ? Shortness of breath.  ? Cough.  ? Pain in the abdomen.  ? Headache.  ? Abnormal tiredness.  ? A change in your level of consciousness.  ? Confusion.  Get help right away if:  · You are not able to remove a tick.  · A part of a tick breaks off and gets stuck in your skin.  · Your symptoms get worse.  Summary  · Ticks may carry germs that can spread to a person through a bite and cause disease.  · Wear protective clothing and use insect repellent to prevent tick bites. Follow product instructions.  · If you find a tick on your body, remove it as soon as possible. If the tick is attached, do not try to remove with heat, alcohol, petroleum jelly, or fingernail polish.  · Remove the attached tick using tweezers, curved forceps, or a tick-removal tool. Gently pull with steady, upward pressure until the tick lets go. Do not twist or jerk the tick. Do not squeeze or crush the tick's body.  · If you have symptoms after being bitten by a tick, contact a health care provider.  This information is not intended to replace advice given to you by your health care provider. Make sure you discuss any questions you have with your health care provider.  Document Released: 12/15/2001 Document Revised: 09/29/2017 Document Reviewed: 09/29/2017  [employer] Interactive Patient Education © 2018 [employer]  Doxycycline tablets or capsules  What is this " medicine?  DOXYCYCLINE (dox ashia gaston) is a tetracycline antibiotic. It kills certain bacteria or stops their growth. It is used to treat many kinds of infections, like dental, skin, respiratory, and urinary tract infections. It also treats acne, Lyme disease, malaria, and certain sexually transmitted infections.  This medicine may be used for other purposes; ask your health care provider or pharmacist if you have questions.  COMMON BRAND NAME(S): Acticlate, Adoxa, Adoxa CK, Adoxa K[place]an, Adoxa TT, Alodox, Avidoxy, Doxal, Mondoxyne NL, Monodox, Morgidox 1x, Morgidox 1x Kit, Morgidox 2x, Morgidox 2x Kit, NutriDox, Ocudox, TARGADOX, Vibra-Tabs, Vibramycin  What should I tell my health care provider before I take this medicine?  They need to know if you have any of these conditions:  -liver disease  -long exposure to sunlight like working outdoors  -stomach problems like colitis  -an unusual or allergic reaction to doxycycline, tetracycline antibiotics, other medicines, foods, dyes, or preservatives  -pregnant or trying to get pregnant  -breast-feeding  How should I use this medicine?  Take this medicine by mouth with a full glass of water. Follow the directions on the prescription label. It is best to take this medicine without food, but if it upsets your stomach take it with food. Take your medicine at regular intervals. Do not take your medicine more often than directed. Take all of your medicine as directed even if you think you are better. Do not skip doses or stop your medicine early.  Talk to your pediatrician regarding the use of this medicine in children. While this drug may be prescribed for selected conditions, precautions do apply.  Overdosage: If you think you have taken too much of this medicine contact a poison control center or emergency room at once.  NOTE: This medicine is only for you. Do not share this medicine with others.  What if I miss a dose?  If you miss a dose, take it as soon as you can. If  it is almost time for your next dose, take only that dose. Do not take double or extra doses.  What may interact with this medicine?  -antacids  -barbiturates  -birth control pills  -bismuth subsalicylate  -carbamazepine  -methoxyflurane  -other antibiotics  -phenytoin  -vitamins that contain iron  -warfarin  This list may not describe all possible interactions. Give your health care provider a list of all the medicines, herbs, non-prescription drugs, or dietary supplements you use. Also tell them if you smoke, drink alcohol, or use illegal drugs. Some items may interact with your medicine.  What should I watch for while using this medicine?  Tell your doctor or health care professional if your symptoms do not improve.  Do not treat diarrhea with over the counter products. Contact your doctor if you have diarrhea that lasts more than 2 days or if it is severe and watery.  Do not take this medicine just before going to bed. It may not dissolve properly when you lay down and can cause pain in your throat. Drink plenty of fluids while taking this medicine to also help reduce irritation in your throat.  This medicine can make you more sensitive to the sun. Keep out of the sun. If you cannot avoid being in the sun, wear protective clothing and use sunscreen. Do not use sun lamps or tanning beds/booths.  Birth control pills may not work properly while you are taking this medicine. Talk to your doctor about using an extra method of birth control.  If you are being treated for a sexually transmitted infection, avoid sexual contact until you have finished your treatment. Your sexual partner may also need treatment.  Avoid antacids, aluminum, calcium, magnesium, and iron products for 4 hours before and 2 hours after taking a dose of this medicine.  If you are using this medicine to prevent malaria, you should still protect yourself from contact with mosquitos. Stay in screened-in areas, use mosquito nets, keep your body  covered, and use an insect repellent.  What side effects may I notice from receiving this medicine?  Side effects that you should report to your doctor or health care professional as soon as possible:  -allergic reactions like skin rash, itching or hives, swelling of the face, lips, or tongue  -difficulty breathing  -fever  -itching in the rectal or genital area  -pain on swallowing  -redness, blistering, peeling or loosening of the skin, including inside the mouth  -severe stomach pain or cramps  -unusual bleeding or bruising  -unusually weak or tired  -yellowing of the eyes or skin  Side effects that usually do not require medical attention (report to your doctor or health care professional if they continue or are bothersome):  -diarrhea  -loss of appetite  -nausea, vomiting  This list may not describe all possible side effects. Call your doctor for medical advice about side effects. You may report side effects to FDA at 8-148-FDA-7496.  Where should I keep my medicine?  Keep out of the reach of children.  Store at room temperature, below 30 degrees C (86 degrees F). Protect from light. Keep container tightly closed. Throw away any unused medicine after the expiration date. Taking this medicine after the expiration date can make you seriously ill.  NOTE: This sheet is a summary. It may not cover all possible information. If you have questions about this medicine, talk to your doctor, pharmacist, or health care provider.  © 2018 [employer] (2017-01-18 17:11:22)

## 2019-01-04 DIAGNOSIS — I10 BENIGN ESSENTIAL HYPERTENSION: ICD-10-CM

## 2019-01-04 RX ORDER — ACEBUTOLOL HYDROCHLORIDE 200 MG/1
CAPSULE ORAL
Qty: 180 CAPSULE | Refills: 0 | Status: SHIPPED | OUTPATIENT
Start: 2019-01-04 | End: 2019-02-11 | Stop reason: SDUPTHER

## 2019-01-04 NOTE — TELEPHONE ENCOUNTER
Called pt. Says did not mind was that long since last seen. He says will be follow every 6 months from now.   Ok by Daisy to send 90 days supplies.

## 2019-01-07 DIAGNOSIS — I10 BENIGN ESSENTIAL HYPERTENSION: ICD-10-CM

## 2019-01-08 DIAGNOSIS — I10 BENIGN ESSENTIAL HYPERTENSION: ICD-10-CM

## 2019-01-08 RX ORDER — INDAPAMIDE 1.25 MG/1
1.25 TABLET, FILM COATED ORAL DAILY
Qty: 90 TABLET | Refills: 0 | Status: SHIPPED | OUTPATIENT
Start: 2019-01-08 | End: 2019-01-08 | Stop reason: SDUPTHER

## 2019-01-08 RX ORDER — INDAPAMIDE 1.25 MG/1
1.25 TABLET, FILM COATED ORAL DAILY
Qty: 90 TABLET | Refills: 0 | Status: SHIPPED | OUTPATIENT
Start: 2019-01-08 | End: 2019-11-12 | Stop reason: SDUPTHER

## 2019-02-06 DIAGNOSIS — I10 BENIGN ESSENTIAL HYPERTENSION: Primary | ICD-10-CM

## 2019-02-06 DIAGNOSIS — E78.2 MIXED HYPERLIPIDEMIA: Primary | ICD-10-CM

## 2019-02-06 DIAGNOSIS — Z01.89 ROUTINE LAB DRAW: ICD-10-CM

## 2019-02-06 LAB
BASOPHILS # BLD AUTO: 0.03 10*3/MM3 (ref 0–0.2)
BASOPHILS NFR BLD AUTO: 0.3 % (ref 0–1.5)
EOSINOPHIL # BLD AUTO: 0.26 10*3/MM3 (ref 0–0.7)
EOSINOPHIL NFR BLD AUTO: 2.4 % (ref 0.3–6.2)
ERYTHROCYTE [DISTWIDTH] IN BLOOD BY AUTOMATED COUNT: 14.5 % (ref 11.5–14.5)
HCT VFR BLD AUTO: 47.5 % (ref 40.4–52.2)
HGB BLD-MCNC: 16.4 G/DL (ref 13.7–17.6)
IMM GRANULOCYTES # BLD AUTO: 0.07 10*3/MM3 (ref 0–0.03)
IMM GRANULOCYTES NFR BLD AUTO: 0.6 % (ref 0–0.5)
LYMPHOCYTES # BLD AUTO: 1.23 10*3/MM3 (ref 0.9–4.8)
LYMPHOCYTES NFR BLD AUTO: 11.2 % (ref 19.6–45.3)
MCH RBC QN AUTO: 32.7 PG (ref 27–32.7)
MCHC RBC AUTO-ENTMCNC: 34.5 G/DL (ref 32.6–36.4)
MCV RBC AUTO: 94.8 FL (ref 79.8–96.2)
MONOCYTES # BLD AUTO: 0.7 10*3/MM3 (ref 0.2–1.2)
MONOCYTES NFR BLD AUTO: 6.4 % (ref 5–12)
NEUTROPHILS # BLD AUTO: 8.76 10*3/MM3 (ref 1.9–8.1)
NEUTROPHILS NFR BLD AUTO: 79.7 % (ref 42.7–76)
PLATELET # BLD AUTO: 284 10*3/MM3 (ref 140–500)
RBC # BLD AUTO: 5.01 10*6/MM3 (ref 4.6–6)
WBC # BLD AUTO: 10.98 10*3/MM3 (ref 4.5–10.7)

## 2019-02-07 LAB
ALBUMIN SERPL-MCNC: 4.8 G/DL (ref 3.5–5.2)
ALBUMIN/GLOB SERPL: 2.2 G/DL
ALP SERPL-CCNC: 71 U/L (ref 39–117)
ALT SERPL-CCNC: 27 U/L (ref 1–41)
AST SERPL-CCNC: 20 U/L (ref 1–40)
BILIRUB SERPL-MCNC: 1.3 MG/DL (ref 0.1–1.2)
BUN SERPL-MCNC: 18 MG/DL (ref 8–23)
BUN/CREAT SERPL: 18.4 (ref 7–25)
CALCIUM SERPL-MCNC: 10.1 MG/DL (ref 8.6–10.5)
CHLORIDE SERPL-SCNC: 99 MMOL/L (ref 98–107)
CHOLEST SERPL-MCNC: 212 MG/DL (ref 0–200)
CO2 SERPL-SCNC: 26.8 MMOL/L (ref 22–29)
CREAT SERPL-MCNC: 0.98 MG/DL (ref 0.76–1.27)
CRP SERPL-MCNC: 0.08 MG/DL (ref 0–0.5)
GLOBULIN SER CALC-MCNC: 2.2 GM/DL
GLUCOSE SERPL-MCNC: 100 MG/DL (ref 65–99)
HDLC SERPL-MCNC: 42 MG/DL (ref 40–60)
LDLC SERPL CALC-MCNC: 146 MG/DL (ref 0–100)
LDLC/HDLC SERPL: 3.47 {RATIO}
POTASSIUM SERPL-SCNC: 4.4 MMOL/L (ref 3.5–5.2)
PROT SERPL-MCNC: 7 G/DL (ref 6–8.5)
SODIUM SERPL-SCNC: 141 MMOL/L (ref 136–145)
TRIGL SERPL-MCNC: 121 MG/DL (ref 0–150)
VLDLC SERPL CALC-MCNC: 24.2 MG/DL (ref 5–40)

## 2019-02-11 ENCOUNTER — OFFICE VISIT (OUTPATIENT)
Dept: FAMILY MEDICINE CLINIC | Facility: CLINIC | Age: 64
End: 2019-02-11

## 2019-02-11 VITALS
RESPIRATION RATE: 16 BRPM | WEIGHT: 173 LBS | OXYGEN SATURATION: 98 % | HEART RATE: 63 BPM | HEIGHT: 72 IN | DIASTOLIC BLOOD PRESSURE: 82 MMHG | SYSTOLIC BLOOD PRESSURE: 118 MMHG | BODY MASS INDEX: 23.43 KG/M2

## 2019-02-11 DIAGNOSIS — B35.1 ONYCHOMYCOSIS DUE TO DERMATOPHYTE: ICD-10-CM

## 2019-02-11 DIAGNOSIS — Z00.00 WELCOME TO MEDICARE PREVENTIVE VISIT: Primary | ICD-10-CM

## 2019-02-11 DIAGNOSIS — I10 BENIGN ESSENTIAL HYPERTENSION: ICD-10-CM

## 2019-02-11 DIAGNOSIS — E78.2 MIXED HYPERLIPIDEMIA: ICD-10-CM

## 2019-02-11 DIAGNOSIS — M51.36 DEGENERATION OF INTERVERTEBRAL DISC OF LUMBAR REGION: ICD-10-CM

## 2019-02-11 PROCEDURE — G0403 EKG FOR INITIAL PREVENT EXAM: HCPCS | Performed by: FAMILY MEDICINE

## 2019-02-11 PROCEDURE — 99213 OFFICE O/P EST LOW 20 MIN: CPT | Performed by: FAMILY MEDICINE

## 2019-02-11 PROCEDURE — G0402 INITIAL PREVENTIVE EXAM: HCPCS | Performed by: FAMILY MEDICINE

## 2019-02-11 RX ORDER — PRAVASTATIN SODIUM 10 MG
10 TABLET ORAL DAILY
Qty: 90 TABLET | Refills: 0 | Status: SHIPPED | OUTPATIENT
Start: 2019-02-11 | End: 2019-05-02 | Stop reason: SDUPTHER

## 2019-02-11 RX ORDER — INDAPAMIDE 1.25 MG/1
1.25 TABLET, FILM COATED ORAL DAILY
Qty: 90 TABLET | Refills: 0 | Status: SHIPPED | OUTPATIENT
Start: 2019-02-11 | End: 2019-07-08 | Stop reason: SDUPTHER

## 2019-02-11 RX ORDER — METAXALONE 800 MG/1
800 TABLET ORAL 3 TIMES DAILY
Qty: 90 TABLET | Refills: 3 | Status: SHIPPED | OUTPATIENT
Start: 2019-02-11 | End: 2019-02-13 | Stop reason: ALTCHOICE

## 2019-02-11 NOTE — PROGRESS NOTES
QUICK REFERENCE INFORMATION:  The ABCs of the Annual Wellness Visit    Subsequent Medicare Wellness Visit    HEALTH RISK ASSESSMENT    1955    Recent Hospitalizations:  No hospitalization(s) within the last year..    Current Medical Providers:  Patient Care Team:  Marjorie Villa MD as PCP - General (Family Medicine)    Smoking Status:  Social History     Tobacco Use   Smoking Status Never Smoker   Smokeless Tobacco Never Used       Alcohol Consumption:  Social History     Substance and Sexual Activity   Alcohol Use Yes    Comment: 1-2 per day       Depression Screen:   PHQ-2/PHQ-9 Depression Screening 2/11/2019   Little interest or pleasure in doing things 0   Feeling down, depressed, or hopeless 0   Total Score 0       Health Habits and Functional and Cognitive Screening:  Functional & Cognitive Status 2/11/2019   Do you have difficulty preparing food and eating? No   Do you have difficulty bathing yourself, getting dressed or grooming yourself? No   Do you have difficulty using the toilet? No   Do you have difficulty moving around from place to place? No   Do you have trouble with steps or getting out of a bed or a chair? No   In the past year have you fallen or experienced a near fall? No   Current Diet Well Balanced Diet   Dental Exam Up to date   Eye Exam Up to date   Exercise (times per week) 4 times per week   Current Exercise Activities Include Walking   Do you need help using the phone?  No   Are you deaf or do you have serious difficulty hearing?  No   Do you need help with transportation? No   Do you need help shopping? No   Do you need help preparing meals?  No   Do you need help with housework?  No   Do you need help with laundry? No   Do you need help taking your medications? No   Do you need help managing money? No   Do you ever drive or ride in a car without wearing a seat belt? No   Have you felt unusual stress, anger or loneliness in the last month? No   Who do you live with? Alone   If you  need help, do you have trouble finding someone available to you? No   Have you been bothered in the last four weeks by sexual problems? No   Do you have difficulty concentrating, remembering or making decisions? No     Health Habits  Current Diet: Well Balanced Diet  Dental Exam: Up to date  Eye Exam: Up to date  Exercise (times per week): 4 times per week  Current Exercise Activities Include: Walking    Does the patient have evidence of cognitive impairment? No    Aspirin use counseling: Does not need ASA but is currently taking (advised patient that ASA is not indicated and patient chooses to stop it)    Recent Lab Results:  CMP:  Lab Results   Component Value Date     (H) 02/06/2019    BUN 18 02/06/2019    CREATININE 0.98 02/06/2019    EGFRIFNONA 77 02/06/2019    EGFRIFAFRI 93 02/06/2019    BCR 18.4 02/06/2019     02/06/2019    K 4.4 02/06/2019    CO2 26.8 02/06/2019    CALCIUM 10.1 02/06/2019    PROTENTOTREF 7.0 02/06/2019    ALBUMIN 4.80 02/06/2019    LABGLOBREF 2.2 02/06/2019    LABIL2 2.2 02/06/2019    BILITOT 1.3 (H) 02/06/2019    ALKPHOS 71 02/06/2019    AST 20 02/06/2019    ALT 27 02/06/2019     Lipid Panel:  Lab Results   Component Value Date    TRIG 121 02/06/2019    HDL 42 02/06/2019    VLDL 24.2 02/06/2019    LDLHDL 3.47 02/06/2019     HbA1c:       Visual Acuity:  No exam data present    Age-appropriate Screening Schedule:  Refer to the list below for future screening recommendations based on patient's age, sex and/or medical conditions. Orders for these recommended tests are listed in the plan section. The patient has been provided with a written plan.    Health Maintenance   Topic Date Due   • PNEUMOCOCCAL VACCINES (65+ LOW/MEDIUM RISK) (1 of 2 - PCV13) 11/19/2018   • ZOSTER VACCINE (3 of 3) 03/03/2019   • LIPID PANEL  02/06/2020   • TDAP/TD VACCINES (3 - Td) 12/03/2025   • INFLUENZA VACCINE  Completed        Subjective   History of Present Illness    Emery Kincaid is a 63 y.o. male  who presents for an Subsequent Wellness Visit.  HPI   Patient is here for follow-up of hypertension. He is exercising and is adherent to a low-salt diet. Patient does check BP occasionally.. Patient denies chest pain and dyspnea. Cardiovascular risk factors: advanced age (older than 55 for men, 65 for women), dyslipidemia, hypertension and male gender. Use of agents associated with hypertension: none. History of target organ damage: none. He is compliant with meds.     The following portions of the patient's history were reviewed and updated as appropriate: allergies, current medications, past medical history, past social history, past surgical history and problem list.    Outpatient Medications Prior to Visit   Medication Sig Dispense Refill   • acebutolol (SECTRAL) 200 MG capsule Take 1 capsule by mouth Every 12 (Twelve) Hours. 90 capsule 3   • aspirin 81 MG chewable tablet Chew.     • Garlic 500 MG capsule Take by mouth.     • Glucosamine-Chondroit-Vit C-Mn (GLUCOSAMINE CHONDR 1500 COMPLX PO) Take by mouth.     • indapamide (LOZOL) 1.25 MG tablet TAKE 1 TABLET BY MOUTH DAILY. 90 tablet 0   • Multiple Vitamin (MULTIVITAMINS PO) Take by mouth.     • Omega-3 Fatty Acids (FISH OIL) 1200 MG capsule Take by mouth.     • Saw Palmetto 500 MG capsule Take by mouth.     • metaxalone (SKELAXIN) 800 MG tablet Take 1 tablet by mouth 3 (Three) Times a Day. 90 tablet 3   • naproxen (NAPROSYN) 250 MG tablet Take by mouth.     • ranitidine (ZANTAC) 300 MG tablet Take 1 tablet (300 mg total) by mouth nightly. 90 tablet 3   • Resveratrol 100 MG capsule Take by mouth.     • acebutolol (SECTRAL) 200 MG capsule TAKE 1 CAPSULE BY MOUTH EVERY 12 HOURS 180 capsule 0     No facility-administered medications prior to visit.        Patient Active Problem List   Diagnosis   • Hyperlipidemia   • Benign essential hypertension   • Back pain   • Degeneration of intervertebral disc of lumbar region   • Onychomycosis       Advance Care  Planning:  has NO advance directive - information provided to the patient today    Identification of Risk Factors:  Risk factors include: cardiovascular risk.    Review of Systems   Constitutional: Negative for activity change, appetite change, chills, fatigue, fever and unexpected weight change.   HENT: Negative for congestion, ear pain, hearing loss, mouth sores, nosebleeds, rhinorrhea and sore throat.    Eyes: Negative for pain and visual disturbance.   Respiratory: Negative for cough, shortness of breath and wheezing.    Cardiovascular: Negative for chest pain, palpitations and leg swelling.   Gastrointestinal: Negative for abdominal distention, abdominal pain, blood in stool, constipation, diarrhea, nausea and vomiting.   Endocrine: Negative for cold intolerance and heat intolerance.   Genitourinary: Negative for difficulty urinating, discharge, dysuria, frequency, hematuria and urgency.   Musculoskeletal: Negative for back pain and joint swelling.   Skin: Negative for rash and wound.   Neurological: Negative for dizziness, weakness, numbness and headaches.   Hematological: Does not bruise/bleed easily.   Psychiatric/Behavioral: Negative for confusion, dysphoric mood, sleep disturbance and suicidal ideas. The patient is not nervous/anxious.        Compared to one year ago, the patient feels his physical health is the same.  Compared to one year ago, the patient feels his mental health is the same.    Objective     Physical Exam   Constitutional: He is oriented to person, place, and time. Vital signs are normal. He appears well-developed and well-nourished. No distress.   HENT:   Head: Normocephalic.   Cardiovascular: Normal rate, regular rhythm and normal heart sounds.   Pulmonary/Chest: Effort normal and breath sounds normal.   Neurological: He is alert and oriented to person, place, and time. Gait normal.   Psychiatric: He has a normal mood and affect. His behavior is normal. Judgment and thought content  "normal.   Vitals reviewed.      Vitals:    02/11/19 0841   BP: 118/82   Pulse: 63   Resp: 16   SpO2: 98%   Weight: 78.5 kg (173 lb)   Height: 182.9 cm (72\")       ECG 12 Lead  Date/Time: 2/11/2019 1:38 PM  Performed by: Marjorie Villa MD  Authorized by: Marjorie Villa MD   Comparison: not compared with previous ECG   Previous ECG: no previous ECG available  Rhythm: sinus bradycardia  Rate: bradycardic  Conduction: conduction normal  ST Segments: ST segments normal  T Waves: T waves normal  T depression: III and aVF  QRS axis: normal  Other: no other findings  Clinical impression: non-specific ECG          Would like to obtain previous EKG's    Patient's Body mass index is 23.46 kg/m². BMI is within normal parameters. No follow-up required..    Assessment/Plan   Patient Self-Management and Personalized Health Advice  The patient has been provided with information about: designing advance directives and preventive services including:   · Advance directive, [employer].    Visit Diagnoses:  Problem List Items Addressed This Visit        Cardiovascular and Mediastinum    Benign essential hypertension    Overview     [facility] 02/11/19 BP is controlled well. He will recheck in six months. He will continue present meds.           Relevant Orders    CT Cardiac Calcium Score Without Dye    Hyperlipidemia    Current Assessment & Plan     [facility] 2/11/2019  He is willing to take a low dose statin now since he has maximized his diet and exercise.          Relevant Medications    pravastatin (PRAVACHOL) 10 MG tablet    Other Relevant Orders    CT Cardiac Calcium Score Without Dye       Musculoskeletal and Integument    Degeneration of intervertebral disc of lumbar region    Relevant Medications    metaxalone (SKELAXIN) 800 MG tablet      Other Visit Diagnoses     Welcome to Medicare preventive visit    -  Primary          Orders Placed This Encounter   Procedures   • CT Cardiac Calcium Score Without Dye     Standing Status:   " Future     Standing Expiration Date:   2/11/2020       Outpatient Encounter Medications as of 2/11/2019   Medication Sig Dispense Refill   • acebutolol (SECTRAL) 200 MG capsule Take 1 capsule by mouth Every 12 (Twelve) Hours. 90 capsule 3   • aspirin 81 MG chewable tablet Chew.     • Garlic 500 MG capsule Take by mouth.     • Glucosamine-Chondroit-Vit C-Mn (GLUCOSAMINE CHONDR 1500 COMPLX PO) Take by mouth.     • indapamide (LOZOL) 1.25 MG tablet TAKE 1 TABLET BY MOUTH DAILY. 90 tablet 0   • metaxalone (SKELAXIN) 800 MG tablet Take 1 tablet by mouth 3 (Three) Times a Day. 90 tablet 3   • Multiple Vitamin (MULTIVITAMINS PO) Take by mouth.     • Omega-3 Fatty Acids (FISH OIL) 1200 MG capsule Take by mouth.     • Saw Palmetto 500 MG capsule Take by mouth.     • [DISCONTINUED] metaxalone (SKELAXIN) 800 MG tablet Take 1 tablet by mouth 3 (Three) Times a Day. 90 tablet 3   • naproxen (NAPROSYN) 250 MG tablet Take by mouth.     • pravastatin (PRAVACHOL) 10 MG tablet Take 1 tablet by mouth Daily. 90 tablet 0   • ranitidine (ZANTAC) 300 MG tablet Take 1 tablet (300 mg total) by mouth nightly. 90 tablet 3   • Resveratrol 100 MG capsule Take by mouth.     • [DISCONTINUED] acebutolol (SECTRAL) 200 MG capsule TAKE 1 CAPSULE BY MOUTH EVERY 12 HOURS 180 capsule 0     No facility-administered encounter medications on file as of 2/11/2019.        Reviewed use of high risk medication in the elderly: yes  Reviewed for potential of harmful drug interactions in the elderly: yes    Follow Up:  Return in about 6 months (around 8/11/2019).     An After Visit Summary and PPPS with all of these plans were given to the patient.

## 2019-02-11 NOTE — ASSESSMENT & PLAN NOTE
Kallie 2/11/2019  He is willing to take a low dose statin now since he has maximized his diet and exercise.

## 2019-02-12 DIAGNOSIS — I10 BENIGN ESSENTIAL HYPERTENSION: ICD-10-CM

## 2019-02-13 RX ORDER — INDAPAMIDE 1.25 MG/1
1.25 TABLET, FILM COATED ORAL DAILY
Qty: 90 TABLET | Refills: 0 | Status: SHIPPED | OUTPATIENT
Start: 2019-02-13 | End: 2019-07-08 | Stop reason: SDUPTHER

## 2019-02-13 RX ORDER — TIZANIDINE HYDROCHLORIDE 4 MG/1
4 CAPSULE, GELATIN COATED ORAL 3 TIMES DAILY
Qty: 90 CAPSULE | Refills: 3 | Status: SHIPPED | OUTPATIENT
Start: 2019-02-13 | End: 2020-04-23 | Stop reason: SDUPTHER

## 2019-02-20 DIAGNOSIS — I10 BENIGN ESSENTIAL HYPERTENSION: ICD-10-CM

## 2019-02-20 RX ORDER — INDAPAMIDE 1.25 MG/1
TABLET, FILM COATED ORAL
Qty: 90 TABLET | Refills: 1 | Status: SHIPPED | OUTPATIENT
Start: 2019-02-20 | End: 2019-07-08 | Stop reason: SDUPTHER

## 2019-02-21 ENCOUNTER — APPOINTMENT (OUTPATIENT)
Dept: CT IMAGING | Facility: HOSPITAL | Age: 64
End: 2019-02-21

## 2019-03-30 DIAGNOSIS — I10 BENIGN ESSENTIAL HYPERTENSION: ICD-10-CM

## 2019-04-01 RX ORDER — ACEBUTOLOL HYDROCHLORIDE 200 MG/1
CAPSULE ORAL
Qty: 180 CAPSULE | Refills: 0 | Status: SHIPPED | OUTPATIENT
Start: 2019-04-01 | End: 2019-07-25 | Stop reason: ALTCHOICE

## 2019-05-02 DIAGNOSIS — E78.2 MIXED HYPERLIPIDEMIA: ICD-10-CM

## 2019-05-02 RX ORDER — PRAVASTATIN SODIUM 10 MG
TABLET ORAL
Qty: 90 TABLET | Refills: 1 | Status: SHIPPED | OUTPATIENT
Start: 2019-05-02 | End: 2019-10-25 | Stop reason: SDUPTHER

## 2019-07-03 ENCOUNTER — OFFICE VISIT (OUTPATIENT)
Dept: RETAIL CLINIC | Facility: CLINIC | Age: 64
End: 2019-07-03

## 2019-07-03 VITALS
OXYGEN SATURATION: 97 % | SYSTOLIC BLOOD PRESSURE: 128 MMHG | DIASTOLIC BLOOD PRESSURE: 86 MMHG | HEART RATE: 68 BPM | TEMPERATURE: 98.3 F | RESPIRATION RATE: 18 BRPM

## 2019-07-03 DIAGNOSIS — L08.9 SKIN INFECTION: Primary | ICD-10-CM

## 2019-07-03 PROCEDURE — 99213 OFFICE O/P EST LOW 20 MIN: CPT | Performed by: NURSE PRACTITIONER

## 2019-07-03 RX ORDER — AMOXICILLIN AND CLAVULANATE POTASSIUM 875; 125 MG/1; MG/1
1 TABLET, FILM COATED ORAL 2 TIMES DAILY
Qty: 20 TABLET | Refills: 0 | Status: SHIPPED | OUTPATIENT
Start: 2019-07-03 | End: 2019-07-13

## 2019-07-03 NOTE — PROGRESS NOTES
Subjective   Emery Kincaid is a 63 y.o. male.     Abrasion   This is a new problem. The current episode started 1 to 4 weeks ago. The problem has been gradually improving. Pertinent negatives include no abdominal pain, arthralgias, chills, congestion, coughing, fatigue, fever, headaches, joint swelling, myalgias, nausea, rash or sore throat. Nothing aggravates the symptoms. Treatments tried: antibiotic cream, alcohol. The treatment provided mild relief.        The following portions of the patient's history were reviewed and updated as appropriate: allergies, current medications, past family history, past medical history, past social history, past surgical history and problem list.    Review of Systems   Constitutional: Negative for chills, fatigue and fever.   HENT: Negative for congestion and sore throat.    Respiratory: Negative.  Negative for cough.    Cardiovascular: Negative.    Gastrointestinal: Negative.  Negative for abdominal pain and nausea.   Musculoskeletal: Negative for arthralgias, joint swelling and myalgias.   Skin: Positive for skin lesions (lower right leg (calf)). Negative for rash.       Objective   Physical Exam   Constitutional: He is oriented to person, place, and time. Vital signs are normal. He appears well-developed.   HENT:   Head: Normocephalic.   Cardiovascular: Normal rate, regular rhythm and normal heart sounds.   Pulmonary/Chest: Effort normal and breath sounds normal.   Abdominal: Normal appearance.   Neurological: He is alert and oriented to person, place, and time.   Skin: Skin is warm and dry. Abrasion noted. There is erythema.        Psychiatric: He has a normal mood and affect.         Assessment/Plan   Emery was seen today for abrasion.    Diagnoses and all orders for this visit:    Skin infection  -     amoxicillin-clavulanate (AUGMENTIN) 875-125 MG per tablet; Take 1 tablet by mouth 2 (Two) Times a Day for 10 days.     Pt to follow up with PCP if symptoms worsen or fail  to improve.      Cellulitis, Adult  Cellulitis is a skin infection. The infected area is usually red and tender. This condition occurs most often in the arms and lower legs. The infection can travel to the muscles, blood, and underlying tissue and become serious. It is very important to get treated for this condition.  What are the causes?  Cellulitis is caused by bacteria. The bacteria enter through a break in the skin, such as a cut, burn, insect bite, open sore, or crack.  What increases the risk?  This condition is more likely to occur in people who:  · Have a weak defense system (immune system).  · Have open wounds on the skin such as cuts, burns, bites, and scrapes. Bacteria can enter the body through these open wounds.  · Are older.  · Have diabetes.  · Have a type of long-lasting (chronic) liver disease (cirrhosis) or kidney disease.  · Use IV drugs.    What are the signs or symptoms?  Symptoms of this condition include:  · Redness, streaking, or spotting on the skin.  · Swollen area of the skin.  · Tenderness or pain when an area of the skin is touched.  · Warm skin.  · Fever.  · Chills.  · Blisters.    How is this diagnosed?  This condition is diagnosed based on a medical history and physical exam. You may also have tests, including:  · Blood tests.  · Lab tests.  · Imaging tests.    How is this treated?  Treatment for this condition may include:  · Medicines, such as antibiotic medicines or antihistamines.  · Supportive care, such as rest and application of cold or warm cloths (cold or warm compresses) to the skin.  · Hospital care, if the condition is severe.    The infection usually gets better within 1-2 days of treatment.  Follow these instructions at home:  · Take over-the-counter and prescription medicines only as told by your health care provider.  · If you were prescribed an antibiotic medicine, take it as told by your health care provider. Do not stop taking the antibiotic even if you start to  feel better.  · Drink enough fluid to keep your urine clear or pale yellow.  · Do not touch or rub the infected area.  · Raise (elevate) the infected area above the level of your heart while you are sitting or lying down.  · Apply warm or cold compresses to the affected area as told by your health care provider.  · Keep all follow-up visits as told by your health care provider. This is important. These visits let your health care provider make sure a more serious infection is not developing.  Contact a health care provider if:  · You have a fever.  · Your symptoms do not improve within 1-2 days of starting treatment.  · Your bone or joint underneath the infected area becomes painful after the skin has healed.  · Your infection returns in the same area or another area.  · You notice a swollen bump in the infected area.  · You develop new symptoms.  · You have a general ill feeling (malaise) with muscle aches and pains.  Get help right away if:  · Your symptoms get worse.  · You feel very sleepy.  · You develop vomiting or diarrhea that persists.  · You notice red streaks coming from the infected area.  · Your red area gets larger or turns dark in color.  This information is not intended to replace advice given to you by your health care provider. Make sure you discuss any questions you have with your health care provider.  Document Released: 09/27/2006 Document Revised: 04/27/2017 Document Reviewed: 10/26/2016  [employer] Interactive Patient Education © 2019 [employer]

## 2019-07-03 NOTE — PATIENT INSTRUCTIONS
Cellulitis, Adult  Cellulitis is a skin infection. The infected area is usually red and tender. This condition occurs most often in the arms and lower legs. The infection can travel to the muscles, blood, and underlying tissue and become serious. It is very important to get treated for this condition.  What are the causes?  Cellulitis is caused by bacteria. The bacteria enter through a break in the skin, such as a cut, burn, insect bite, open sore, or crack.  What increases the risk?  This condition is more likely to occur in people who:  · Have a weak defense system (immune system).  · Have open wounds on the skin such as cuts, burns, bites, and scrapes. Bacteria can enter the body through these open wounds.  · Are older.  · Have diabetes.  · Have a type of long-lasting (chronic) liver disease (cirrhosis) or kidney disease.  · Use IV drugs.    What are the signs or symptoms?  Symptoms of this condition include:  · Redness, streaking, or spotting on the skin.  · Swollen area of the skin.  · Tenderness or pain when an area of the skin is touched.  · Warm skin.  · Fever.  · Chills.  · Blisters.    How is this diagnosed?  This condition is diagnosed based on a medical history and physical exam. You may also have tests, including:  · Blood tests.  · Lab tests.  · Imaging tests.    How is this treated?  Treatment for this condition may include:  · Medicines, such as antibiotic medicines or antihistamines.  · Supportive care, such as rest and application of cold or warm cloths (cold or warm compresses) to the skin.  · Hospital care, if the condition is severe.    The infection usually gets better within 1-2 days of treatment.  Follow these instructions at home:  · Take over-the-counter and prescription medicines only as told by your health care provider.  · If you were prescribed an antibiotic medicine, take it as told by your health care provider. Do not stop taking the antibiotic even if you start to feel  better.  · Drink enough fluid to keep your urine clear or pale yellow.  · Do not touch or rub the infected area.  · Raise (elevate) the infected area above the level of your heart while you are sitting or lying down.  · Apply warm or cold compresses to the affected area as told by your health care provider.  · Keep all follow-up visits as told by your health care provider. This is important. These visits let your health care provider make sure a more serious infection is not developing.  Contact a health care provider if:  · You have a fever.  · Your symptoms do not improve within 1-2 days of starting treatment.  · Your bone or joint underneath the infected area becomes painful after the skin has healed.  · Your infection returns in the same area or another area.  · You notice a swollen bump in the infected area.  · You develop new symptoms.  · You have a general ill feeling (malaise) with muscle aches and pains.  Get help right away if:  · Your symptoms get worse.  · You feel very sleepy.  · You develop vomiting or diarrhea that persists.  · You notice red streaks coming from the infected area.  · Your red area gets larger or turns dark in color.  This information is not intended to replace advice given to you by your health care provider. Make sure you discuss any questions you have with your health care provider.  Document Released: 09/27/2006 Document Revised: 04/27/2017 Document Reviewed: 10/26/2016  [employer] Interactive Patient Education © 2019 [employer]

## 2019-07-08 ENCOUNTER — OFFICE VISIT (OUTPATIENT)
Dept: FAMILY MEDICINE CLINIC | Facility: CLINIC | Age: 64
End: 2019-07-08

## 2019-07-08 VITALS
WEIGHT: 175.9 LBS | SYSTOLIC BLOOD PRESSURE: 140 MMHG | HEIGHT: 72 IN | DIASTOLIC BLOOD PRESSURE: 80 MMHG | OXYGEN SATURATION: 97 % | HEART RATE: 60 BPM | BODY MASS INDEX: 23.83 KG/M2 | RESPIRATION RATE: 18 BRPM

## 2019-07-08 DIAGNOSIS — L03.115 CELLULITIS OF RIGHT LOWER EXTREMITY: ICD-10-CM

## 2019-07-08 DIAGNOSIS — I10 BENIGN ESSENTIAL HYPERTENSION: ICD-10-CM

## 2019-07-08 DIAGNOSIS — E78.2 MIXED HYPERLIPIDEMIA: Primary | ICD-10-CM

## 2019-07-08 PROCEDURE — 99214 OFFICE O/P EST MOD 30 MIN: CPT | Performed by: FAMILY MEDICINE

## 2019-07-08 RX ORDER — LOSARTAN POTASSIUM 100 MG/1
100 TABLET ORAL DAILY
Qty: 90 TABLET | Refills: 1 | Status: SHIPPED | OUTPATIENT
Start: 2019-07-08 | End: 2019-09-19 | Stop reason: ALTCHOICE

## 2019-07-08 NOTE — PROGRESS NOTES
ASSESSMENT AND PLAN  Problem List Items Addressed This Visit        Cardiovascular and Mediastinum    Benign essential hypertension    Overview     LARSderek 07/08/19 He would like to take losartan instead of the acebutolol. He will take one a day for five days and then three doses of every other day. Start the losartan right away. He will let us know how his BP is.           Hyperlipidemia - Primary    Current Assessment & Plan     Otto 7/8/2019  He needs repeat lipids on the pravastatin.            Other Visit Diagnoses     Cellulitis of right lower extremity            F/U and PATIENT  INSTRUCTIONS    No Follow-up on file.  Patient Instructions   take one a day for five days and then three doses of every other day. Start the losartan right away.        SUBJECTIVE  Emery Kincaid is a 63 y.o. male being seen in our office today for Hypertension and cut on leg (right leg is on antibiotic currently)               Social History  He  reports that he has never smoked. He has never used smokeless tobacco. He reports that he drinks alcohol. He reports that he does not use drugs.    History of the Present Illness   HPI He piece of straw that cut his leg and he got an infection in that. He got augmentin. He is using some antibiotic ointment on it.     Needs labs on the pravastatin, no side effects there.     He is concerned that his betablocker might be disrupting his sleep. 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 He takes his BP regularly -- every one to two days. His brother tolerates losartan without a problem. He'd like to take that. Used lisinopril in the past and it made him feel badly    Patient is here for follow-up of hypertension. He is exercising and is adherent to a low-salt diet. Patient does BP checks at home: It is well controlled when checked.. Patient denies chest pain and dyspnea. Cardiovascular risk factors: dyslipidemia, hypertension and male gender. Use  of agents associated with hypertension: none. History of target organ damage: none. He is compliant with meds.   Significant Past History  The following portions of the patient's history were reviewed and updated as appropriate:PMHroutine: Social history , Allergies, Current Medications, Active Problem List and Health Maintenance    Review of Systems   Constitutional: Negative for activity change, appetite change, chills, fatigue, fever and unexpected weight change.   HENT: Negative for congestion, ear pain, hearing loss, mouth sores, nosebleeds, rhinorrhea and sore throat.    Eyes: Negative for pain and visual disturbance.   Respiratory: Negative for cough, shortness of breath and wheezing.    Cardiovascular: Negative for chest pain, palpitations and leg swelling.   Gastrointestinal: Negative for abdominal distention, abdominal pain, blood in stool, constipation, diarrhea, nausea and vomiting.   Endocrine: Negative for cold intolerance and heat intolerance.   Genitourinary: Negative for difficulty urinating, discharge, dysuria, frequency, hematuria and urgency.   Musculoskeletal: Negative for back pain and joint swelling.   Skin: Negative for rash and wound.   Neurological: Negative for dizziness, weakness, numbness and headaches.   Hematological: Does not bruise/bleed easily.   Psychiatric/Behavioral: Negative for confusion, dysphoric mood, sleep disturbance and suicidal ideas. The patient is not nervous/anxious.        OBJECTIVE   Vital Signs          BP Readings from Last 1 Encounters:   07/08/19 140/80     Wt Readings from Last 3 Encounters:   07/08/19 79.8 kg (175 lb 14.4 oz)   02/11/19 78.5 kg (173 lb)   08/15/17 76.2 kg (168 lb)   Body mass index is 23.86 kg/m².     Physical Exam         Data Reviewed     No results found for this or any previous visit (from the past 2016 hour(s)).

## 2019-07-09 LAB
ALBUMIN SERPL-MCNC: 4.8 G/DL (ref 3.5–5.2)
ALBUMIN/GLOB SERPL: 2.1 G/DL
ALP SERPL-CCNC: 84 U/L (ref 39–117)
ALT SERPL-CCNC: 27 U/L (ref 1–41)
AST SERPL-CCNC: 24 U/L (ref 1–40)
BILIRUB SERPL-MCNC: 1.2 MG/DL (ref 0.2–1.2)
BUN SERPL-MCNC: 19 MG/DL (ref 8–23)
BUN/CREAT SERPL: 17.3 (ref 7–25)
CALCIUM SERPL-MCNC: 10 MG/DL (ref 8.6–10.5)
CHLORIDE SERPL-SCNC: 99 MMOL/L (ref 98–107)
CHOLEST SERPL-MCNC: 184 MG/DL (ref 0–200)
CO2 SERPL-SCNC: 27.7 MMOL/L (ref 22–29)
CREAT SERPL-MCNC: 1.1 MG/DL (ref 0.76–1.27)
GLOBULIN SER CALC-MCNC: 2.3 GM/DL
GLUCOSE SERPL-MCNC: 97 MG/DL (ref 65–99)
HDLC SERPL-MCNC: 41 MG/DL (ref 40–60)
LDLC SERPL CALC-MCNC: 106 MG/DL (ref 0–100)
LDLC/HDLC SERPL: 2.59 {RATIO}
POTASSIUM SERPL-SCNC: 4.1 MMOL/L (ref 3.5–5.2)
PROT SERPL-MCNC: 7.1 G/DL (ref 6–8.5)
SODIUM SERPL-SCNC: 138 MMOL/L (ref 136–145)
TRIGL SERPL-MCNC: 185 MG/DL (ref 0–150)
VLDLC SERPL CALC-MCNC: 37 MG/DL

## 2019-07-25 ENCOUNTER — OFFICE VISIT (OUTPATIENT)
Dept: FAMILY MEDICINE CLINIC | Facility: CLINIC | Age: 64
End: 2019-07-25

## 2019-07-25 VITALS
HEIGHT: 72 IN | DIASTOLIC BLOOD PRESSURE: 82 MMHG | OXYGEN SATURATION: 98 % | SYSTOLIC BLOOD PRESSURE: 122 MMHG | RESPIRATION RATE: 18 BRPM | BODY MASS INDEX: 23.32 KG/M2 | WEIGHT: 172.2 LBS | HEART RATE: 83 BPM

## 2019-07-25 DIAGNOSIS — L01.00 IMPETIGO: Primary | ICD-10-CM

## 2019-07-25 DIAGNOSIS — I10 BENIGN ESSENTIAL HYPERTENSION: ICD-10-CM

## 2019-07-25 DIAGNOSIS — L03.115 CELLULITIS OF RIGHT LOWER EXTREMITY: ICD-10-CM

## 2019-07-25 PROCEDURE — 99214 OFFICE O/P EST MOD 30 MIN: CPT | Performed by: FAMILY MEDICINE

## 2019-08-05 DIAGNOSIS — I10 BENIGN ESSENTIAL HYPERTENSION: ICD-10-CM

## 2019-08-05 RX ORDER — ACEBUTOLOL HYDROCHLORIDE 200 MG/1
CAPSULE ORAL
Qty: 180 CAPSULE | Refills: 0 | Status: SHIPPED | OUTPATIENT
Start: 2019-08-05 | End: 2020-04-23

## 2019-09-09 ENCOUNTER — PATIENT MESSAGE (OUTPATIENT)
Dept: FAMILY MEDICINE CLINIC | Facility: CLINIC | Age: 64
End: 2019-09-09

## 2019-09-19 RX ORDER — LOSARTAN POTASSIUM 25 MG/1
25 TABLET ORAL DAILY
Qty: 90 TABLET | Refills: 1 | Status: SHIPPED | OUTPATIENT
Start: 2019-09-19 | End: 2020-01-13 | Stop reason: SDUPTHER

## 2019-10-25 DIAGNOSIS — E78.2 MIXED HYPERLIPIDEMIA: ICD-10-CM

## 2019-10-25 RX ORDER — PRAVASTATIN SODIUM 10 MG
TABLET ORAL
Qty: 90 TABLET | Refills: 1 | Status: SHIPPED | OUTPATIENT
Start: 2019-10-25 | End: 2020-09-29

## 2019-11-12 DIAGNOSIS — I10 BENIGN ESSENTIAL HYPERTENSION: ICD-10-CM

## 2019-11-12 RX ORDER — INDAPAMIDE 1.25 MG/1
TABLET, FILM COATED ORAL
Qty: 90 TABLET | Refills: 1 | Status: SHIPPED | OUTPATIENT
Start: 2019-11-12 | End: 2020-04-23 | Stop reason: SDUPTHER

## 2019-11-23 ENCOUNTER — OFFICE VISIT (OUTPATIENT)
Dept: RETAIL CLINIC | Facility: CLINIC | Age: 64
End: 2019-11-23

## 2019-11-23 VITALS
TEMPERATURE: 98.1 F | SYSTOLIC BLOOD PRESSURE: 128 MMHG | HEART RATE: 77 BPM | DIASTOLIC BLOOD PRESSURE: 86 MMHG | RESPIRATION RATE: 18 BRPM | OXYGEN SATURATION: 97 %

## 2019-11-23 DIAGNOSIS — B97.89 VIRAL RESPIRATORY ILLNESS: ICD-10-CM

## 2019-11-23 DIAGNOSIS — J01.40 ACUTE PANSINUSITIS, RECURRENCE NOT SPECIFIED: Primary | ICD-10-CM

## 2019-11-23 DIAGNOSIS — J98.8 VIRAL RESPIRATORY ILLNESS: ICD-10-CM

## 2019-11-23 PROCEDURE — 99213 OFFICE O/P EST LOW 20 MIN: CPT | Performed by: NURSE PRACTITIONER

## 2019-11-23 RX ORDER — FLUTICASONE PROPIONATE 50 MCG
1 SPRAY, SUSPENSION (ML) NASAL EVERY 12 HOURS
Qty: 1 BOTTLE | Refills: 0 | Status: SHIPPED | OUTPATIENT
Start: 2019-11-23 | End: 2019-11-30

## 2019-11-23 RX ORDER — AMOXICILLIN AND CLAVULANATE POTASSIUM 875; 125 MG/1; MG/1
1 TABLET, FILM COATED ORAL 2 TIMES DAILY
Qty: 14 TABLET | Refills: 0 | Status: SHIPPED | OUTPATIENT
Start: 2019-11-23 | End: 2019-11-30

## 2019-11-23 NOTE — PATIENT INSTRUCTIONS

## 2019-11-23 NOTE — PROGRESS NOTES
Subjective   Patient ID: Emery Kincaid is a 64 y.o. male presents with   Chief Complaint   Patient presents with   • Sinus Problem       Sinus Problem   This is a new problem. The current episode started 1 to 4 weeks ago (9days). The problem has been gradually worsening since onset. There has been no fever. The pain is mild. Associated symptoms include congestion, coughing (mild, dry), headaches, sinus pressure, sneezing and a sore throat. Pertinent negatives include no chills, diaphoresis, ear pain or shortness of breath. Treatments tried: antihistamine. The treatment provided mild relief.       No Known Allergies    The following portions of the patient's history were reviewed and updated as appropriate: allergies, current medications, past family history, past medical history, past social history, past surgical history and problem list.      Review of Systems   Constitutional: Positive for appetite change (lower) and fatigue. Negative for chills, diaphoresis and fever.   HENT: Positive for congestion, postnasal drip, rhinorrhea, sinus pressure, sneezing and sore throat. Negative for ear pain.    Respiratory: Positive for cough (mild, dry). Negative for chest tightness, shortness of breath and wheezing.    Cardiovascular: Negative.    Gastrointestinal: Negative.    Neurological: Positive for headaches.       Objective     Vitals:    11/23/19 0908   BP: 128/86   Pulse: 77   Resp: 18   Temp: 98.1 °F (36.7 °C)   SpO2: 97%         Physical Exam   Constitutional: He is oriented to person, place, and time. He appears well-developed and well-nourished. He does not appear ill. No distress.   HENT:   Head: Normocephalic.   Right Ear: Hearing, tympanic membrane, external ear and ear canal normal.   Left Ear: Hearing, tympanic membrane, external ear and ear canal normal.   Nose: Mucosal edema and sinus tenderness present. No rhinorrhea. Right sinus exhibits maxillary sinus tenderness and frontal sinus tenderness. Left  sinus exhibits maxillary sinus tenderness and frontal sinus tenderness.   Mouth/Throat: Mucous membranes are normal. Posterior oropharyngeal erythema (mild) present. No tonsillar exudate.   Eyes: Conjunctivae are normal.   Sclera white.   Neck: No tracheal deviation present.   Cardiovascular: Normal rate, regular rhythm, S1 normal, S2 normal and normal heart sounds.   Pulmonary/Chest: Effort normal and breath sounds normal. No accessory muscle usage. No respiratory distress.   Abdominal: Soft. Bowel sounds are normal. There is no tenderness.   Lymphadenopathy:     He has no cervical adenopathy.   Neurological: He is alert and oriented to person, place, and time.   Skin: Skin is warm and dry.   Vitals reviewed.        Emery was seen today for sinus problem.    Diagnoses and all orders for this visit:    Acute pansinusitis, recurrence not specified  -     amoxicillin-clavulanate (AUGMENTIN) 875-125 MG per tablet; Take 1 tablet by mouth 2 (Two) Times a Day for 7 days.    Viral respiratory illness  -     fluticasone (FLONASE) 50 MCG/ACT nasal spray; 1 spray into the nostril(s) as directed by provider Every 12 (Twelve) Hours for 7 days.        Patient understands possible side effects of all medications ordered. Follow-up with Primary Care Physician in 48-72 hours if these symptoms worsen or fail to improve as anticipated. Patient verbalizes understanding.    Sinusitis, Adult  Sinusitis is inflammation of your sinuses. Sinuses are hollow spaces in the bones around your face. Your sinuses are located:  · Around your eyes.  · In the middle of your forehead.  · Behind your nose.  · In your cheekbones.  Mucus normally drains out of your sinuses. When your nasal tissues become inflamed or swollen, mucus can become trapped or blocked. This allows bacteria, viruses, and fungi to grow, which leads to infection. Most infections of the sinuses are caused by a virus.  Sinusitis can develop quickly. It can last for up to 4 weeks  (acute) or for more than 12 weeks (chronic). Sinusitis often develops after a cold.  What are the causes?  This condition is caused by anything that creates swelling in the sinuses or stops mucus from draining. This includes:  · Allergies.  · Asthma.  · Infection from bacteria or viruses.  · Deformities or blockages in your nose or sinuses.  · Abnormal growths in the nose (nasal polyps).  · Pollutants, such as chemicals or irritants in the air.  · Infection from fungi (rare).  What increases the risk?  You are more likely to develop this condition if you:  · Have a weak body defense system (immune system).  · Do a lot of swimming or diving.  · Overuse nasal sprays.  · Smoke.  What are the signs or symptoms?  The main symptoms of this condition are pain and a feeling of pressure around the affected sinuses. Other symptoms include:  · Stuffy nose or congestion.  · Thick drainage from your nose.  · Swelling and warmth over the affected sinuses.  · Headache.  · Upper toothache.  · A cough that may get worse at night.  · Extra mucus that collects in the throat or the back of the nose (postnasal drip).  · Decreased sense of smell and taste.  · Fatigue.  · A fever.  · Sore throat.  · Bad breath.  How is this diagnosed?  This condition is diagnosed based on:  · Your symptoms.  · Your medical history.  · A physical exam.  · Tests to find out if your condition is acute or chronic. This may include:  ? Checking your nose for nasal polyps.  ? Viewing your sinuses using a device that has a light (endoscope).  ? Testing for allergies or bacteria.  ? Imaging tests, such as an MRI or CT scan.  In rare cases, a bone biopsy may be done to rule out more serious types of fungal sinus disease.  How is this treated?  Treatment for sinusitis depends on the cause and whether your condition is chronic or acute.  · If caused by a virus, your symptoms should go away on their own within 10 days. You may be given medicines to relieve symptoms.  They include:  ? Medicines that shrink swollen nasal passages (topical intranasal decongestants).  ? Medicines that treat allergies (antihistamines).  ? A spray that eases inflammation of the nostrils (topical intranasal corticosteroids).  ? Rinses that help get rid of thick mucus in your nose (nasal saline washes).  · If caused by bacteria, your health care provider may recommend waiting to see if your symptoms improve. Most bacterial infections will get better without antibiotic medicine. You may be given antibiotics if you have:  ? A severe infection.  ? A weak immune system.  · If caused by narrow nasal passages or nasal polyps, you may need to have surgery.  Follow these instructions at home:  Medicines  · Take, use, or apply over-the-counter and prescription medicines only as told by your health care provider. These may include nasal sprays.  · If you were prescribed an antibiotic medicine, take it as told by your health care provider. Do not stop taking the antibiotic even if you start to feel better.  Hydrate and humidify    · Drink enough fluid to keep your urine pale yellow. Staying hydrated will help to thin your mucus.  · Use a cool mist humidifier to keep the humidity level in your home above 50%.  · Inhale steam for 10-15 minutes, 3-4 times a day, or as told by your health care provider. You can do this in the bathroom while a hot shower is running.  · Limit your exposure to cool or dry air.  Rest  · Rest as much as possible.  · Sleep with your head raised (elevated).  · Make sure you get enough sleep each night.  General instructions    · Apply a warm, moist washcloth to your face 3-4 times a day or as told by your health care provider. This will help with discomfort.  · Wash your hands often with soap and water to reduce your exposure to germs. If soap and water are not available, use hand .  · Do not smoke. Avoid being around people who are smoking (secondhand smoke).  · Keep all follow-up  visits as told by your health care provider. This is important.  Contact a health care provider if:  · You have a fever.  · Your symptoms get worse.  · Your symptoms do not improve within 10 days.  Get help right away if:  · You have a severe headache.  · You have persistent vomiting.  · You have severe pain or swelling around your face or eyes.  · You have vision problems.  · You develop confusion.  · Your neck is stiff.  · You have trouble breathing.  Summary  · Sinusitis is soreness and inflammation of your sinuses. Sinuses are hollow spaces in the bones around your face.  · This condition is caused by nasal tissues that become inflamed or swollen. The swelling traps or blocks the flow of mucus. This allows bacteria, viruses, and fungi to grow, which leads to infection.  · If you were prescribed an antibiotic medicine, take it as told by your health care provider. Do not stop taking the antibiotic even if you start to feel better.  · Keep all follow-up visits as told by your health care provider. This is important.  This information is not intended to replace advice given to you by your health care provider. Make sure you discuss any questions you have with your health care provider.  Document Released: 12/18/2006 Document Revised: 05/20/2019 Document Reviewed: 05/20/2019  [employer] Interactive Patient Education © 2019 [employer]  Viral Respiratory Infection  A respiratory infection is an illness that affects part of the respiratory system, such as the lungs, nose, or throat. A respiratory infection that is caused by a virus is called a viral respiratory infection.  Common types of viral respiratory infections include:  · A cold.  · The flu (influenza).  · A respiratory syncytial virus (RSV) infection.  What are the causes?  This condition is caused by a virus.  What are the signs or symptoms?  Symptoms of this condition include:  · A stuffy or runny nose.  · Yellow or green nasal discharge.  · A  cough.  · Sneezing.  · Fatigue.  · Achy muscles.  · A sore throat.  · Sweating or chills.  · A fever.  · A headache.  How is this diagnosed?  This condition may be diagnosed based on:  · Your symptoms.  · A physical exam.  · Testing of nasal swabs.  How is this treated?  This condition may be treated with medicines, such as:  · Antiviral medicine. This may shorten the length of time a person has symptoms.  · Expectorants. These make it easier to cough up mucus.  · Decongestant nasal sprays.  · Acetaminophen or NSAIDs to relieve fever and pain.  Antibiotic medicines are not prescribed for viral infections. This is because antibiotics are designed to kill bacteria. They are not effective against viruses.  Follow these instructions at home:    Managing pain and congestion  · Take over-the-counter and prescription medicines only as told by your health care provider.  · If you have a sore throat, gargle with a salt-water mixture 3-4 times a day or as needed. To make a salt-water mixture, completely dissolve ½-1 tsp of salt in 1 cup of warm water.  · Use nose drops made from salt water to ease congestion and soften raw skin around your nose.  · Drink enough fluid to keep your urine pale yellow. This helps prevent dehydration and helps loosen up mucus.  General instructions  · Rest as much as possible.  · Do not drink alcohol.  · Do not use any products that contain nicotine or tobacco, such as cigarettes and e-cigarettes. If you need help quitting, ask your health care provider.  · Keep all follow-up visits as told by your health care provider. This is important.  How is this prevented?    · Get an annual flu shot. You may get the flu shot in late summer, fall, or winter. Ask your health care provider when you should get your flu shot.  · Avoid exposing others to your respiratory infection.  ? Stay home from work or school as told by your health care provider.  ? Wash your hands with soap and water often, especially after  you cough or sneeze. If soap and water are not available, use alcohol-based hand .  · Avoid contact with people who are sick during cold and flu season. This is generally fall and winter.  Contact a health care provider if:  · Your symptoms last for 10 days or longer.  · Your symptoms get worse over time.  · You have a fever.  · You have severe sinus pain in your face or forehead.  · The glands in your jaw or neck become very swollen.  Get help right away if you:  · Feel pain or pressure in your chest.  · Have shortness of breath.  · Faint or feel like you will faint.  · Have severe and persistent vomiting.  · Feel confused or disoriented.  Summary  · A respiratory infection is an illness that affects part of the respiratory system, such as the lungs, nose, or throat. A respiratory infection that is caused by a virus is called a viral respiratory infection.  · Common types of viral respiratory infections are a cold, influenza, and respiratory syncytial virus (RSV) infection.  · Symptoms of this condition include a stuffy or runny nose, cough, sneezing, fatigue, achy muscles, sore throat, and fevers or chills.  · Antibiotic medicines are not prescribed for viral infections. This is because antibiotics are designed to kill bacteria. They are not effective against viruses.  This information is not intended to replace advice given to you by your health care provider. Make sure you discuss any questions you have with your health care provider.  Document Released: 09/27/2006 Document Revised: 01/28/2019 Document Reviewed: 01/28/2019  [employer] Interactive Patient Education © 2019 [employer]

## 2019-12-15 DIAGNOSIS — J98.8 VIRAL RESPIRATORY ILLNESS: ICD-10-CM

## 2019-12-15 DIAGNOSIS — B97.89 VIRAL RESPIRATORY ILLNESS: ICD-10-CM

## 2019-12-17 RX ORDER — FLUTICASONE PROPIONATE 50 MCG
SPRAY, SUSPENSION (ML) NASAL
Qty: 16 ML | Refills: 0 | OUTPATIENT
Start: 2019-12-17

## 2020-01-13 DIAGNOSIS — I10 BENIGN ESSENTIAL HYPERTENSION: Primary | ICD-10-CM

## 2020-01-13 RX ORDER — LOSARTAN POTASSIUM 25 MG/1
25 TABLET ORAL 2 TIMES DAILY
Qty: 180 TABLET | Refills: 0 | Status: SHIPPED | OUTPATIENT
Start: 2020-01-13 | End: 2020-02-25

## 2020-02-25 DIAGNOSIS — I10 BENIGN ESSENTIAL HYPERTENSION: ICD-10-CM

## 2020-02-25 RX ORDER — LOSARTAN POTASSIUM 25 MG/1
TABLET ORAL
Qty: 90 TABLET | Refills: 0 | Status: SHIPPED | OUTPATIENT
Start: 2020-02-25 | End: 2020-06-09

## 2020-04-17 DIAGNOSIS — E78.2 MIXED HYPERLIPIDEMIA: ICD-10-CM

## 2020-04-17 RX ORDER — PRAVASTATIN SODIUM 10 MG
TABLET ORAL
Qty: 90 TABLET | Refills: 1 | OUTPATIENT
Start: 2020-04-17

## 2020-04-17 NOTE — TELEPHONE ENCOUNTER
Left vm,     I need to know if he has been keeping track of bp readings.    Also we need to schedule him for a video visit with dr. smith to maintain refills.

## 2020-04-23 ENCOUNTER — TELEMEDICINE (OUTPATIENT)
Dept: FAMILY MEDICINE CLINIC | Facility: CLINIC | Age: 65
End: 2020-04-23

## 2020-04-23 VITALS
SYSTOLIC BLOOD PRESSURE: 123 MMHG | DIASTOLIC BLOOD PRESSURE: 81 MMHG | HEIGHT: 72 IN | WEIGHT: 169 LBS | HEART RATE: 72 BPM | BODY MASS INDEX: 22.89 KG/M2

## 2020-04-23 DIAGNOSIS — I10 BENIGN ESSENTIAL HYPERTENSION: Primary | ICD-10-CM

## 2020-04-23 DIAGNOSIS — G89.29 CHRONIC BILATERAL LOW BACK PAIN WITHOUT SCIATICA: ICD-10-CM

## 2020-04-23 DIAGNOSIS — M54.50 CHRONIC BILATERAL LOW BACK PAIN WITHOUT SCIATICA: ICD-10-CM

## 2020-04-23 PROCEDURE — 99213 OFFICE O/P EST LOW 20 MIN: CPT | Performed by: FAMILY MEDICINE

## 2020-04-23 RX ORDER — TIZANIDINE HYDROCHLORIDE 4 MG/1
4 CAPSULE, GELATIN COATED ORAL 3 TIMES DAILY
Qty: 90 CAPSULE | Refills: 3 | Status: SHIPPED | OUTPATIENT
Start: 2020-04-23 | End: 2020-07-16

## 2020-04-23 RX ORDER — INDAPAMIDE 1.25 MG/1
1.25 TABLET, FILM COATED ORAL DAILY
Qty: 90 TABLET | Refills: 1 | Status: SHIPPED | OUTPATIENT
Start: 2020-04-23 | End: 2020-11-09

## 2020-04-23 NOTE — PROGRESS NOTES
ASSESSMENT AND PLAN     Problem List Items Addressed This Visit        Cardiovascular and Mediastinum    Benign essential hypertension - Primary    Overview     Kallie 07/25/19 His losartan change -- he is having some morning elevated numbers in the 90s right before his pill at 8 am. He was hoping the change would help his sleep. Recommend that he take the two medications separately. Lozol in am and losartan in the morning.            Relevant Medications    indapamide (LOZOL) 1.25 MG tablet       Nervous and Auditory    Back pain    Relevant Medications    TiZANidine (ZANAFLEX) 4 MG capsule        Return in about 3 months (around 7/23/2020) for yearly Medicare Exam, lab with next visit.  Patient was given instructions and counseling regarding his condition or for health maintenance advice. Please see specific information pulled into the AVS if appropriate.        SUBJECTIVE  Emery Kincaid is a 64 y.o. male being seen on video today via [facility]: MyChart/Zoom for Hypertension and Hyperlipidemia               Social History  He  reports that he has never smoked. He has never used smokeless tobacco. He reports that he drinks about 14.0 standard drinks of alcohol per week. He reports that he does not use drugs.    History of the Present Illness   HPI His BP has been fine. He has been using the tizandine is also helping his teeth grinding and he uses it occasional. Helps him get to sleep as well. He has lost some weight and probably because he doesn't go to any buffets.    Significant Past History  The following portions of the patient's history were reviewed and updated as appropriate:PMHroutine: Social history , Allergies, Current Medications, Active Problem List and Health Maintenance    Review of Systems   Constitutional: Negative for activity change, fatigue and fever.   Respiratory: Negative for cough and shortness of breath.    Cardiovascular: Negative for chest pain.   Musculoskeletal: Negative for  back pain.   Psychiatric/Behavioral: Negative for dysphoric mood and sleep disturbance. The patient is not nervous/anxious.      OBJECTIVE  Additional parties in room with patient during tele consult: none  Physical Exam   Constitutional: He appears well-developed and well-nourished.   Psychiatric: He has a normal mood and affect. His behavior is normal. Judgment and thought content normal.   Vitals reviewed.        10 min

## 2020-06-07 DIAGNOSIS — I10 BENIGN ESSENTIAL HYPERTENSION: ICD-10-CM

## 2020-06-09 RX ORDER — LOSARTAN POTASSIUM 25 MG/1
TABLET ORAL
Qty: 90 TABLET | Refills: 0 | Status: SHIPPED | OUTPATIENT
Start: 2020-06-09 | End: 2020-07-30 | Stop reason: SDUPTHER

## 2020-07-16 DIAGNOSIS — M54.50 CHRONIC BILATERAL LOW BACK PAIN WITHOUT SCIATICA: ICD-10-CM

## 2020-07-16 DIAGNOSIS — G89.29 CHRONIC BILATERAL LOW BACK PAIN WITHOUT SCIATICA: ICD-10-CM

## 2020-07-16 RX ORDER — TIZANIDINE 4 MG/1
TABLET ORAL
Qty: 90 TABLET | Refills: 0 | Status: SHIPPED | OUTPATIENT
Start: 2020-07-16 | End: 2021-08-03

## 2020-07-22 DIAGNOSIS — R53.83 FATIGUE, UNSPECIFIED TYPE: ICD-10-CM

## 2020-07-22 DIAGNOSIS — E78.2 MIXED HYPERLIPIDEMIA: ICD-10-CM

## 2020-07-22 DIAGNOSIS — I10 BENIGN ESSENTIAL HYPERTENSION: Primary | ICD-10-CM

## 2020-07-23 ENCOUNTER — RESULTS ENCOUNTER (OUTPATIENT)
Dept: FAMILY MEDICINE CLINIC | Facility: CLINIC | Age: 65
End: 2020-07-23

## 2020-07-23 DIAGNOSIS — E78.2 MIXED HYPERLIPIDEMIA: ICD-10-CM

## 2020-07-23 DIAGNOSIS — I10 BENIGN ESSENTIAL HYPERTENSION: ICD-10-CM

## 2020-07-23 DIAGNOSIS — R53.83 FATIGUE, UNSPECIFIED TYPE: ICD-10-CM

## 2020-07-23 LAB
BASOPHILS # BLD AUTO: 0.03 10*3/MM3 (ref 0–0.2)
BASOPHILS NFR BLD AUTO: 0.5 % (ref 0–1.5)
EOSINOPHIL # BLD AUTO: 0.11 10*3/MM3 (ref 0–0.4)
EOSINOPHIL NFR BLD AUTO: 1.8 % (ref 0.3–6.2)
ERYTHROCYTE [DISTWIDTH] IN BLOOD BY AUTOMATED COUNT: 12.7 % (ref 12.3–15.4)
HCT VFR BLD AUTO: 47 % (ref 37.5–51)
HGB BLD-MCNC: 16.7 G/DL (ref 13–17.7)
IMM GRANULOCYTES # BLD AUTO: 0.04 10*3/MM3 (ref 0–0.05)
IMM GRANULOCYTES NFR BLD AUTO: 0.6 % (ref 0–0.5)
LYMPHOCYTES # BLD AUTO: 1.05 10*3/MM3 (ref 0.7–3.1)
LYMPHOCYTES NFR BLD AUTO: 16.8 % (ref 19.6–45.3)
MCH RBC QN AUTO: 32.7 PG (ref 26.6–33)
MCHC RBC AUTO-ENTMCNC: 35.5 G/DL (ref 31.5–35.7)
MCV RBC AUTO: 92 FL (ref 79–97)
MONOCYTES # BLD AUTO: 0.47 10*3/MM3 (ref 0.1–0.9)
MONOCYTES NFR BLD AUTO: 7.5 % (ref 5–12)
NEUTROPHILS # BLD AUTO: 4.56 10*3/MM3 (ref 1.7–7)
NEUTROPHILS NFR BLD AUTO: 72.8 % (ref 42.7–76)
NRBC BLD AUTO-RTO: 0 /100 WBC (ref 0–0.2)
PLATELET # BLD AUTO: 264 10*3/MM3 (ref 140–450)
RBC # BLD AUTO: 5.11 10*6/MM3 (ref 4.14–5.8)
WBC # BLD AUTO: 6.26 10*3/MM3 (ref 3.4–10.8)

## 2020-07-24 LAB
ALBUMIN SERPL-MCNC: 5 G/DL (ref 3.5–5.2)
ALBUMIN/GLOB SERPL: 2.4 G/DL
ALP SERPL-CCNC: 82 U/L (ref 39–117)
ALT SERPL-CCNC: 32 U/L (ref 1–41)
AST SERPL-CCNC: 23 U/L (ref 1–40)
BILIRUB SERPL-MCNC: 1 MG/DL (ref 0–1.2)
BUN SERPL-MCNC: 22 MG/DL (ref 8–23)
BUN/CREAT SERPL: 20.6 (ref 7–25)
CALCIUM SERPL-MCNC: 10.1 MG/DL (ref 8.6–10.5)
CHLORIDE SERPL-SCNC: 101 MMOL/L (ref 98–107)
CHOLEST SERPL-MCNC: 175 MG/DL (ref 0–200)
CHOLEST/HDLC SERPL: 3.57 {RATIO}
CO2 SERPL-SCNC: 30.1 MMOL/L (ref 22–29)
CREAT SERPL-MCNC: 1.07 MG/DL (ref 0.76–1.27)
GLOBULIN SER CALC-MCNC: 2.1 GM/DL
GLUCOSE SERPL-MCNC: 104 MG/DL (ref 65–99)
HDLC SERPL-MCNC: 49 MG/DL (ref 40–60)
LDLC SERPL CALC-MCNC: 113 MG/DL (ref 0–100)
POTASSIUM SERPL-SCNC: 4.1 MMOL/L (ref 3.5–5.2)
PROT SERPL-MCNC: 7.1 G/DL (ref 6–8.5)
SODIUM SERPL-SCNC: 141 MMOL/L (ref 136–145)
TRIGL SERPL-MCNC: 63 MG/DL (ref 0–150)
VLDLC SERPL CALC-MCNC: 12.6 MG/DL

## 2020-07-30 ENCOUNTER — OFFICE VISIT (OUTPATIENT)
Dept: FAMILY MEDICINE CLINIC | Facility: CLINIC | Age: 65
End: 2020-07-30

## 2020-07-30 VITALS
BODY MASS INDEX: 23.03 KG/M2 | HEIGHT: 72 IN | SYSTOLIC BLOOD PRESSURE: 126 MMHG | DIASTOLIC BLOOD PRESSURE: 85 MMHG | WEIGHT: 170 LBS

## 2020-07-30 DIAGNOSIS — I10 BENIGN ESSENTIAL HYPERTENSION: ICD-10-CM

## 2020-07-30 DIAGNOSIS — Z00.00 MEDICARE ANNUAL WELLNESS VISIT, SUBSEQUENT: Primary | ICD-10-CM

## 2020-07-30 DIAGNOSIS — E78.2 MIXED HYPERLIPIDEMIA: ICD-10-CM

## 2020-07-30 PROCEDURE — G0439 PPPS, SUBSEQ VISIT: HCPCS | Performed by: FAMILY MEDICINE

## 2020-07-30 RX ORDER — LOSARTAN POTASSIUM 25 MG/1
25 TABLET ORAL 2 TIMES DAILY
Qty: 180 TABLET | Refills: 1 | Status: SHIPPED | OUTPATIENT
Start: 2020-07-30 | End: 2021-02-15

## 2020-07-30 RX ORDER — NIACIN 500 MG
TABLET ORAL
COMMUNITY
Start: 2020-06-01

## 2020-07-30 RX ORDER — DIAPER,BRIEF,ADULT, DISPOSABLE
EACH MISCELLANEOUS
COMMUNITY
Start: 2020-06-01

## 2020-07-30 NOTE — PROGRESS NOTES
QUICK REFERENCE INFORMATION:  The ABCs of the Annual Wellness Visit  {SnapShot  Notes  Encounters  Result Review  Labs  Imaging  Meds  Media :23}   Subjective   History of Present Illness    Emery Kincaid is a 64 y.o. male who presents for a Subsequent Wellness Visit.  [facility]    HEALTH RISK ASSESSMENT    1955    Recent Hospitalizations:  No hospitalization(s) within the last year..    Current Medical Providers:  Patient Care Team:  Marjorie Villa MD as PCP - General (Family Medicine)  Marjorie Villa MD as PCP - Claims Attributed    Smoking Status:  Social History     Tobacco Use   Smoking Status Never Smoker   Smokeless Tobacco Never Used     Alcohol Consumption:  Social History     Substance and Sexual Activity   Alcohol Use Yes   • Alcohol/week: 14.0 standard drinks   • Types: 7 Glasses of wine, 7 Cans of beer per week    Comment: 1-2 per day     Fall Risk Screen:  YAMINI Fall Risk Assessment was completed, and patient is at LOW risk for falls.Assessment completed on:7/30/2020  Depression Screen:   PHQ-2/PHQ-9 Depression Screening 7/30/2020   Little interest or pleasure in doing things 0   Feeling down, depressed, or hopeless 0   Total Score 0     Health Habits and Functional and Cognitive Screening:  Functional & Cognitive Status 7/30/2020   Do you have difficulty preparing food and eating? No   Do you have difficulty bathing yourself, getting dressed or grooming yourself? No   Do you have difficulty using the toilet? No   Do you have difficulty moving around from place to place? No   Do you have trouble with steps or getting out of a bed or a chair? No   Current Diet Well Balanced Diet   Dental Exam Up to date   Eye Exam Up to date   Exercise (times per week) 3 times per week   Current Exercise Activities Include Cardiovasular Workout on Exercise Equipment   Do you need help using the phone?  No   Are you deaf or do you have serious difficulty hearing?  No   Do you need help with  transportation? No   Do you need help shopping? No   Do you need help preparing meals?  No   Do you need help with housework?  No   Do you need help with laundry? No   Do you need help taking your medications? No   Do you need help managing money? No   Do you ever drive or ride in a car without wearing a seat belt? No   Have you felt unusual stress, anger or loneliness in the last month? No   Who do you live with? Alone   If you need help, do you have trouble finding someone available to you? No   Have you been bothered in the last four weeks by sexual problems? -   Do you have difficulty concentrating, remembering or making decisions? No       Health Habits  Current Diet: Well Balanced Diet  Dental Exam: Up to date  Eye Exam: Up to date  Exercise (times per week): 3 times per week  Current Exercise Activities Include: Cardiovasular Workout on Exercise Equipment    Does the patient have evidence of cognitive impairment? No     Aspirin use counseling: Taking ASA appropriately as indicated    Recent Lab Results:  CMP:  Lab Results   Component Value Date     (H) 07/23/2020    BUN 22 07/23/2020    CREATININE 1.07 07/23/2020    EGFRIFNONA 69 07/23/2020    EGFRIFAFRI 84 07/23/2020    BCR 20.6 07/23/2020     07/23/2020    K 4.1 07/23/2020    CO2 30.1 (H) 07/23/2020    CALCIUM 10.1 07/23/2020    PROTENTOTREF 7.1 07/23/2020    ALBUMIN 5.00 07/23/2020    LABGLOBREF 2.1 07/23/2020    LABIL2 2.4 07/23/2020    BILITOT 1.0 07/23/2020    ALKPHOS 82 07/23/2020    AST 23 07/23/2020    ALT 32 07/23/2020     Lipid Panel:  Lab Results   Component Value Date    CHLPL 175 07/23/2020     (H) 07/23/2020    HDL 49 07/23/2020    TRIG 63 07/23/2020     Age-appropriate Screening Schedule:  Refer to the list below for future screening recommendations based on patient's age, sex and/or medical conditions. Orders for these recommended tests are listed in the plan section. The patient has been provided with a written  plan.    Health Maintenance   Topic Date Due   • INFLUENZA VACCINE  08/01/2020   • LIPID PANEL  07/23/2021   • TDAP/TD VACCINES (3 - Td) 12/03/2025   • ZOSTER VACCINE  Completed        The following portions of the patient's history were reviewed and updated as appropriate: allergies, current medications, past medical history, past social history, past surgical history and problem list.    Outpatient Medications Prior to Visit   Medication Sig Dispense Refill   • aspirin 81 MG chewable tablet Chew.     • ciclopirox (PENLAC) 8 % solution Apply  topically to the appropriate area as directed Every Night. 6 mL 5   • Coenzyme Q10 (COQ-10) 100 MG capsule      • Garlic 500 MG capsule Take by mouth.     • Glucosamine-Chondroit-Vit C-Mn (GLUCOSAMINE CHONDR 1500 COMPLX PO) Take by mouth.     • indapamide (LOZOL) 1.25 MG tablet Take 1 tablet by mouth Daily. 90 tablet 1   • Lecithin 1200 MG capsule      • Multiple Vitamin (MULTIVITAMINS PO) Take by mouth.     • niacin 500 MG tablet      • Omega-3 Fatty Acids (FISH OIL) 1200 MG capsule Take by mouth.     • OMEPRAZOLE PO Take  by mouth.     • pravastatin (PRAVACHOL) 10 MG tablet TAKE 1 TABLET BY MOUTH EVERY DAY 90 tablet 1   • Saw Palmetto 500 MG capsule Take by mouth.     • tiZANidine (ZANAFLEX) 4 MG tablet TAKE 1 TABLET BY MOUTH THREE TIMES A DAY 90 tablet 0   • losartan (COZAAR) 25 MG tablet TAKE 1 TABLET BY MOUTH EVERY DAY 90 tablet 0   • naproxen (NAPROSYN) 250 MG tablet Take by mouth.     • ranitidine (ZANTAC) 300 MG tablet Take 1 tablet (300 mg total) by mouth nightly. 90 tablet 3     No facility-administered medications prior to visit.        Patient Active Problem List   Diagnosis   • Hyperlipidemia   • Benign essential hypertension   • Back pain   • Degeneration of intervertebral disc of lumbar region   • Onychomycosis     Advanced Care Planning:  ACP discussion was held with the patient during this visit. Patient has an advance directive (not in EMR), copy  "requested.    Identification of Risk Factors:  Risk factors include: cardiovascular risk    Review of Systems   Constitutional: Negative for activity change, fatigue and fever.   Respiratory: Negative for cough and shortness of breath.    Cardiovascular: Negative for chest pain.   Psychiatric/Behavioral: Negative for dysphoric mood and sleep disturbance. The patient is not nervous/anxious.      I have reviewed the ROS as documented by the MA. Marjorie Villa MD      Compared to one year ago, the patient feels his physical health is the same and his mental health is the same.    Objective     Vitals:    07/30/20 1443   BP: 126/85   Weight: 77.1 kg (170 lb)   Height: 182.9 cm (72\")     Physical Exam   Constitutional: He appears well-developed and well-nourished.   Psychiatric: He has a normal mood and affect. His behavior is normal. Judgment and thought content normal.   Vitals reviewed.    Patient's Body mass index is 23.06 kg/m². BMI is within normal parameters. No follow-up required..    Assessment/Plan   Patient Self-Management and Personalized Health Advice      The patient has been provided with information about:  Advance Directive Discussion    Visit Diagnoses:  Problem List Items Addressed This Visit        Cardiovascular and Mediastinum    Benign essential hypertension    Overview     [facility] 7/30/2020  BP's have been good -- getting 124/81/ 109/75. [facility] 07/25/19 His losartan change -- he is having some morning elevated numbers in the 90s right before his pill at 8 am. He was hoping the change would help his sleep. Recommend that he take the two medications separately. Lozol in am and losartan in the morning.            Relevant Medications    losartan (COZAAR) 25 MG tablet    Hyperlipidemia    Overview     [facility] 7/30/2020    Lab Results   Component Value Date    HDL 49 07/23/2020    HDL 41 07/08/2019    HDL 42 02/06/2019     (H) 07/23/2020     (H) 07/08/2019     (H) 02/06/2019 "               Other Visit Diagnoses     Medicare annual wellness visit, subsequent    -  Primary               Outpatient Encounter Medications as of 7/30/2020   Medication Sig Dispense Refill   • aspirin 81 MG chewable tablet Chew.     • ciclopirox (PENLAC) 8 % solution Apply  topically to the appropriate area as directed Every Night. 6 mL 5   • Coenzyme Q10 (COQ-10) 100 MG capsule      • Garlic 500 MG capsule Take by mouth.     • Glucosamine-Chondroit-Vit C-Mn (GLUCOSAMINE CHONDR 1500 COMPLX PO) Take by mouth.     • indapamide (LOZOL) 1.25 MG tablet Take 1 tablet by mouth Daily. 90 tablet 1   • Lecithin 1200 MG capsule      • losartan (COZAAR) 25 MG tablet Take 1 tablet by mouth 2 (Two) Times a Day. 180 tablet 1   • Multiple Vitamin (MULTIVITAMINS PO) Take by mouth.     • niacin 500 MG tablet      • Omega-3 Fatty Acids (FISH OIL) 1200 MG capsule Take by mouth.     • OMEPRAZOLE PO Take  by mouth.     • pravastatin (PRAVACHOL) 10 MG tablet TAKE 1 TABLET BY MOUTH EVERY DAY 90 tablet 1   • Saw Palmetto 500 MG capsule Take by mouth.     • tiZANidine (ZANAFLEX) 4 MG tablet TAKE 1 TABLET BY MOUTH THREE TIMES A DAY 90 tablet 0   • [DISCONTINUED] losartan (COZAAR) 25 MG tablet TAKE 1 TABLET BY MOUTH EVERY DAY 90 tablet 0   • [DISCONTINUED] naproxen (NAPROSYN) 250 MG tablet Take by mouth.     • [DISCONTINUED] ranitidine (ZANTAC) 300 MG tablet Take 1 tablet (300 mg total) by mouth nightly. 90 tablet 3     No facility-administered encounter medications on file as of 7/30/2020.        Current medication list contains high risk medications. No harmful drug interactions have been identified.  Plan of action none needed  Follow Up:  Return in about 6 months (around 1/30/2021).     An After Visit Summary and PPPS with all of these plans were given to the patient.

## 2020-08-27 ENCOUNTER — TELEPHONE (OUTPATIENT)
Dept: FAMILY MEDICINE CLINIC | Facility: CLINIC | Age: 65
End: 2020-08-27

## 2020-08-27 DIAGNOSIS — Z20.822 CLOSE EXPOSURE TO COVID-19 VIRUS: Primary | ICD-10-CM

## 2020-08-27 NOTE — TELEPHONE ENCOUNTER
Patient is calling back to state he found out that he can get the antibody test at [facility].    Please advise.    Patient call back [phone]

## 2020-08-27 NOTE — TELEPHONE ENCOUNTER
PT CALLED TO REQUEST SUGGESTIONS OF WHERE HE CAN GO TO GET HIS ORDERED ANTIBODY TEST. THE PLACES HE WAS TOLD TO CALL SAID THEY DO NOT PERFORM THAT TEST.    HE WOULD LIKE TO STAY WITHIN THE [facility] SYSTEM IF POSSIBLE SO THEY CAN ACCESS THE ORDERS.    HE ALSO MENTIONED HE MAY TRY [facility], BUT HE WOULD NEED A HARD COPY OF THE ORDERS OR NEED ORDERS SENT OVER THERE.    PLEASE ADVISE.    CALL BACK: [phone]

## 2020-08-28 ENCOUNTER — LAB (OUTPATIENT)
Dept: LAB | Facility: HOSPITAL | Age: 65
End: 2020-08-28

## 2020-08-28 DIAGNOSIS — Z20.822 CLOSE EXPOSURE TO COVID-19 VIRUS: ICD-10-CM

## 2020-08-28 PROCEDURE — 36415 COLL VENOUS BLD VENIPUNCTURE: CPT

## 2020-08-28 PROCEDURE — C9803 HOPD COVID-19 SPEC COLLECT: HCPCS

## 2020-08-28 PROCEDURE — 86769 SARS-COV-2 COVID-19 ANTIBODY: CPT

## 2020-08-29 LAB — SARS-COV-2 AB SERPL QL IA: NEGATIVE

## 2020-09-28 DIAGNOSIS — E78.2 MIXED HYPERLIPIDEMIA: ICD-10-CM

## 2020-09-29 RX ORDER — PRAVASTATIN SODIUM 10 MG
TABLET ORAL
Qty: 90 TABLET | Refills: 1 | Status: SHIPPED | OUTPATIENT
Start: 2020-09-29 | End: 2021-03-22

## 2020-11-08 DIAGNOSIS — I10 BENIGN ESSENTIAL HYPERTENSION: ICD-10-CM

## 2020-11-09 RX ORDER — INDAPAMIDE 1.25 MG/1
TABLET, FILM COATED ORAL
Qty: 90 TABLET | Refills: 1 | Status: SHIPPED | OUTPATIENT
Start: 2020-11-09 | End: 2021-07-29

## 2021-02-13 DIAGNOSIS — I10 BENIGN ESSENTIAL HYPERTENSION: ICD-10-CM

## 2021-02-15 RX ORDER — LOSARTAN POTASSIUM 25 MG/1
25 TABLET ORAL 2 TIMES DAILY
Qty: 60 TABLET | Refills: 0 | Status: SHIPPED | OUTPATIENT
Start: 2021-02-15 | End: 2021-03-05 | Stop reason: SDUPTHER

## 2021-03-04 NOTE — PROGRESS NOTES
Assessment and Plan:     Problem List Items Addressed This Visit     Benign essential hypertension - Primary    Overview     BPs have been a little elevated. When he took 100 mg of Losartan he was dizzy and experienced too low of BPs. He will try tid 25 mg.          Relevant Medications    losartan (COZAAR) 25 MG tablet      Other Visit Diagnoses     Encounter for screening for malignant neoplasm of colon        Relevant Orders    Cologuard - Stool, Per Rectum    Muscle spasm        PRN cracking of his lumbar vertebrae is fine if it relieves pressure.     Relevant Medications    metaxalone (SKELAXIN) 400 MG tablet    Gilbert syndrome        Reassurance, not assoc w/Gallstones.        Return in about 6 months (around 9/5/2021).        Patient was given instructions and counseling regarding his condition or for health maintenance advice. Please see specific information pulled into the AVS if appropriate.        Emery is a 65 y.o. being seen today for Hypertension   Subjective   History of the Present Illness   He previously took losartan 25 mg twice daily but wonders if he should increase to 3 a day as his blood pressure is always borderline.     He has started taking 0.25 of a tablet of tizanidine to help him sleep and would like to know if that is okay. He also requests a metaxalone refill for his muscle spams.     His identical twin was diagnosed with gallstones and since his bilirubin is consistently mildly elevated, he would like to know if there are any specific precautions he should take.     The patient is not having any issues with his bulging discs but would like to know if it is okay to crack his back to relieve pressure.     Social History  He  reports that he has never smoked. He has never used smokeless tobacco. He reports current alcohol use of about 14.0 standard drinks of alcohol per week. He reports that he does not use drugs.  Objective   Vital Signs          BP Readings from Last 1 Encounters:    03/05/21 138/88     Wt Readings from Last 3 Encounters:   03/05/21 81.2 kg (179 lb)   07/30/20 77.1 kg (170 lb)   04/23/20 76.7 kg (169 lb)   Body mass index is 24.28 kg/m².     Physical Exam  Vitals reviewed.   Constitutional:       Appearance: Normal appearance. He is normal weight.   HENT:      Head:      Comments: Mask in place.  Cardiovascular:      Rate and Rhythm: Normal rate and regular rhythm.      Heart sounds: No murmur heard.     Pulmonary:      Effort: Pulmonary effort is normal.      Breath sounds: Normal breath sounds. No wheezing.   Neurological:      Mental Status: He is alert and oriented to person, place, and time.   Psychiatric:         Mood and Affect: Mood normal.         Behavior: Behavior normal.         Thought Content: Thought content normal.         Judgment: Judgment normal.       Scribed for Marjorie Villa MD by DANDY TERAN.  03/06/21   13:39 EST    I have personally performed the services described in this document as scribed by the above individual, and it is both accurate and complete.  Marjorie Villa MD  3/7/2021  19:28 EST

## 2021-03-05 ENCOUNTER — OFFICE VISIT (OUTPATIENT)
Dept: FAMILY MEDICINE CLINIC | Facility: CLINIC | Age: 66
End: 2021-03-05

## 2021-03-05 VITALS
BODY MASS INDEX: 24.24 KG/M2 | DIASTOLIC BLOOD PRESSURE: 88 MMHG | HEIGHT: 72 IN | SYSTOLIC BLOOD PRESSURE: 138 MMHG | OXYGEN SATURATION: 99 % | TEMPERATURE: 97.9 F | RESPIRATION RATE: 16 BRPM | HEART RATE: 88 BPM | WEIGHT: 179 LBS

## 2021-03-05 DIAGNOSIS — E80.4 GILBERT SYNDROME: ICD-10-CM

## 2021-03-05 DIAGNOSIS — Z12.11 ENCOUNTER FOR SCREENING FOR MALIGNANT NEOPLASM OF COLON: ICD-10-CM

## 2021-03-05 DIAGNOSIS — I10 BENIGN ESSENTIAL HYPERTENSION: Primary | ICD-10-CM

## 2021-03-05 DIAGNOSIS — M62.838 MUSCLE SPASM: ICD-10-CM

## 2021-03-05 PROCEDURE — 99214 OFFICE O/P EST MOD 30 MIN: CPT | Performed by: FAMILY MEDICINE

## 2021-03-05 RX ORDER — METAXALONE 400 MG/1
400 TABLET ORAL 3 TIMES DAILY PRN
Qty: 30 TABLET | Refills: 0 | Status: SHIPPED | OUTPATIENT
Start: 2021-03-05 | End: 2022-04-15

## 2021-03-05 RX ORDER — LOSARTAN POTASSIUM 25 MG/1
25 TABLET ORAL 3 TIMES DAILY
Qty: 60 TABLET | Refills: 0 | Status: SHIPPED | OUTPATIENT
Start: 2021-03-05 | End: 2021-03-19

## 2021-03-19 DIAGNOSIS — I10 BENIGN ESSENTIAL HYPERTENSION: ICD-10-CM

## 2021-03-19 RX ORDER — LOSARTAN POTASSIUM 25 MG/1
25 TABLET ORAL 3 TIMES DAILY
Qty: 120 TABLET | Refills: 1 | Status: SHIPPED | OUTPATIENT
Start: 2021-03-19 | End: 2021-06-09

## 2021-03-21 DIAGNOSIS — E78.2 MIXED HYPERLIPIDEMIA: ICD-10-CM

## 2021-03-22 RX ORDER — PRAVASTATIN SODIUM 10 MG
TABLET ORAL
Qty: 90 TABLET | Refills: 1 | Status: SHIPPED | OUTPATIENT
Start: 2021-03-22 | End: 2021-09-03

## 2021-06-09 DIAGNOSIS — I10 BENIGN ESSENTIAL HYPERTENSION: ICD-10-CM

## 2021-06-09 RX ORDER — LOSARTAN POTASSIUM 25 MG/1
TABLET ORAL
Qty: 120 TABLET | Refills: 1 | Status: SHIPPED | OUTPATIENT
Start: 2021-06-09 | End: 2021-09-03 | Stop reason: SDUPTHER

## 2021-07-29 DIAGNOSIS — I10 BENIGN ESSENTIAL HYPERTENSION: ICD-10-CM

## 2021-07-29 RX ORDER — INDAPAMIDE 1.25 MG/1
TABLET, FILM COATED ORAL
Qty: 90 TABLET | Refills: 1 | Status: SHIPPED | OUTPATIENT
Start: 2021-07-29 | End: 2022-01-24

## 2021-08-03 DIAGNOSIS — G89.29 CHRONIC BILATERAL LOW BACK PAIN WITHOUT SCIATICA: ICD-10-CM

## 2021-08-03 DIAGNOSIS — M54.50 CHRONIC BILATERAL LOW BACK PAIN WITHOUT SCIATICA: ICD-10-CM

## 2021-08-03 RX ORDER — TIZANIDINE 4 MG/1
TABLET ORAL
Qty: 90 TABLET | Refills: 0 | Status: SHIPPED | OUTPATIENT
Start: 2021-08-03 | End: 2021-09-01

## 2021-08-24 DIAGNOSIS — E78.2 MIXED HYPERLIPIDEMIA: Primary | ICD-10-CM

## 2021-08-24 DIAGNOSIS — I10 BENIGN ESSENTIAL HYPERTENSION: ICD-10-CM

## 2021-08-24 DIAGNOSIS — Z13.1 DIABETES MELLITUS SCREENING: ICD-10-CM

## 2021-09-01 DIAGNOSIS — G89.29 CHRONIC BILATERAL LOW BACK PAIN WITHOUT SCIATICA: ICD-10-CM

## 2021-09-01 DIAGNOSIS — M54.50 CHRONIC BILATERAL LOW BACK PAIN WITHOUT SCIATICA: ICD-10-CM

## 2021-09-01 RX ORDER — TIZANIDINE 4 MG/1
TABLET ORAL
Qty: 90 TABLET | Refills: 1 | Status: SHIPPED | OUTPATIENT
Start: 2021-09-01 | End: 2021-09-29

## 2021-09-03 ENCOUNTER — OFFICE VISIT (OUTPATIENT)
Dept: FAMILY MEDICINE CLINIC | Facility: CLINIC | Age: 66
End: 2021-09-03

## 2021-09-03 VITALS
WEIGHT: 178 LBS | DIASTOLIC BLOOD PRESSURE: 80 MMHG | HEIGHT: 72 IN | BODY MASS INDEX: 24.11 KG/M2 | OXYGEN SATURATION: 98 % | RESPIRATION RATE: 16 BRPM | HEART RATE: 72 BPM | SYSTOLIC BLOOD PRESSURE: 140 MMHG

## 2021-09-03 DIAGNOSIS — I10 BENIGN ESSENTIAL HYPERTENSION: ICD-10-CM

## 2021-09-03 DIAGNOSIS — Z00.00 MEDICARE ANNUAL WELLNESS VISIT, SUBSEQUENT: Primary | ICD-10-CM

## 2021-09-03 DIAGNOSIS — E78.2 MIXED HYPERLIPIDEMIA: ICD-10-CM

## 2021-09-03 DIAGNOSIS — B35.1 ONYCHOMYCOSIS DUE TO DERMATOPHYTE: ICD-10-CM

## 2021-09-03 PROCEDURE — G0439 PPPS, SUBSEQ VISIT: HCPCS | Performed by: FAMILY MEDICINE

## 2021-09-03 RX ORDER — LOSARTAN POTASSIUM 25 MG/1
25 TABLET ORAL 3 TIMES DAILY
Qty: 360 TABLET | Refills: 1 | Status: SHIPPED | OUTPATIENT
Start: 2021-09-03 | End: 2022-03-07

## 2021-09-03 RX ORDER — PRAVASTATIN SODIUM 20 MG
20 TABLET ORAL DAILY
Qty: 90 TABLET | Refills: 3 | Status: SHIPPED | OUTPATIENT
Start: 2021-09-03 | End: 2022-05-20 | Stop reason: ALTCHOICE

## 2021-09-03 NOTE — PROGRESS NOTES
QUICK REFERENCE INFORMATION:  The ABCs of the Annual Wellness Visit     Subjective   History of Present Illness  Emery Kincaid is a 65 y.o. male who presents for a Subsequent Wellness Visit.    HEALTH RISK ASSESSMENT    1955  Recent Hospitalizations:  No hospitalization(s) within the last year..  Current Medical Providers:  Patient Care Team:  Marjorie Villa MD as PCP - General (Family Medicine)  Smoking Status:  Social History     Tobacco Use   Smoking Status Never Smoker   Smokeless Tobacco Never Used     Alcohol Consumption:  Social History     Substance and Sexual Activity   Alcohol Use Yes   • Alcohol/week: 14.0 standard drinks   • Types: 7 Glasses of wine, 7 Cans of beer per week    Comment: 1-2 per day     Fall Risk Screen:  STEADI Fall Risk Assessment was completed, and patient is at LOW risk for falls.Assessment completed on:9/3/2021  Depression Screen:   PHQ-2/PHQ-9 Depression Screening 9/3/2021   Little interest or pleasure in doing things 0   Feeling down, depressed, or hopeless 0   Total Score 0     Health Habits and Functional and Cognitive Screening:  Functional & Cognitive Status 9/3/2021   Do you have difficulty preparing food and eating? No   Do you have difficulty bathing yourself, getting dressed or grooming yourself? No   Do you have difficulty using the toilet? No   Do you have difficulty moving around from place to place? No   Do you have trouble with steps or getting out of a bed or a chair? No   Current Diet Well Balanced Diet   Dental Exam Up to date   Eye Exam Up to date   Exercise (times per week) 3 times per week   Current Exercises Include Weightlifting;Aerobics   Current Exercise Activities Include -   Do you need help using the phone?  No   Are you deaf or do you have serious difficulty hearing?  No   Do you need help with transportation? No   Do you need help shopping? No   Do you need help preparing meals?  No   Do you need help with housework?  No   Do you need help  "with laundry? No   Do you need help taking your medications? No   Do you need help managing money? No   Do you ever drive or ride in a car without wearing a seat belt? No   Have you felt unusual stress, anger or loneliness in the last month? No   Who do you live with? Alone   If you need help, do you have trouble finding someone available to you? No   Have you been bothered in the last four weeks by sexual problems? No   Do you have difficulty concentrating, remembering or making decisions? No       Health Habits  Current Diet: Well Balanced Diet  Dental Exam: Up to date  Eye Exam: Up to date  Exercise (times per week): 3 times per week  Current Exercises Include: Weightlifting, Aerobics  Does the patient have evidence of cognitive impairment? No   Aspirin use counseling: Taking ASA appropriately as indicated  Recent Lab Results:  Lipid Panel With / Chol / HDL Ratio (09/01/2021 09:17)  Comprehensive Metabolic Panel (09/01/2021 09:17)  CBC & Differential (09/01/2021 09:17)  Age-appropriate Screening Schedule:  Refer to the list below for future screening recommendations based on patient's age, sex and/or medical conditions. Orders for these recommended tests are listed in the plan section. The patient has been provided with a written plan.  Health Maintenance   Topic Date Due   • INFLUENZA VACCINE  10/01/2021   • LIPID PANEL  09/01/2022   • TDAP/TD VACCINES (3 - Td or Tdap) 12/03/2025   • ZOSTER VACCINE  Completed      Advanced Care Planning:  ACP discussion was held with the patient during this visit. Patient has an advance directive (not in EMR), copy requested.  Identification of Risk Factors:  Risk factors include: high cholesterol and age    Compared to one year ago, the patient feels his physical health is the same and his mental health is the same.  Objective     Vitals:    09/03/21 0955   BP: 140/80   Pulse: 72   Resp: 16   SpO2: 98%   Weight: 80.7 kg (178 lb)   Height: 182.9 cm (72\")     Physical " Exam  Vitals reviewed.   Constitutional:       Appearance: Normal appearance.   HENT:      Head:      Comments: Mask in place.  Cardiovascular:      Rate and Rhythm: Normal rate and regular rhythm.      Heart sounds: No murmur heard.     Pulmonary:      Effort: Pulmonary effort is normal.      Breath sounds: Normal breath sounds. No wheezing.   Neurological:      Mental Status: He is alert and oriented to person, place, and time.   Psychiatric:         Mood and Affect: Mood normal.         Behavior: Behavior normal.         Thought Content: Thought content normal.         Judgment: Judgment normal.       Patient's Body mass index is 24.14 kg/m². is within normal parameters. No follow-up required..     Assessment/Plan   Patient Self-Management and Personalized Health Advice      The patient has been provided with information about:  Advance Directive Discussion  Visit Diagnoses:  Problem List Items Addressed This Visit     Benign essential hypertension    Overview     BPs have been a little elevated. When he took 100 mg of Losartan he was dizzy and experienced too low of BPs. He will try tid 25 mg.          Relevant Medications    losartan (COZAAR) 25 MG tablet    Hyperlipidemia    Overview     KANieder 9/3/2021  He will double the pravastatin  Lab Results   Component Value Date    HDL 50 09/01/2021    HDL 49 07/23/2020    HDL 41 07/08/2019     (H) 09/01/2021     (H) 07/23/2020     (H) 07/08/2019     (H) 02/06/2019             Relevant Medications    pravastatin (Pravachol) 20 MG tablet      Other Visit Diagnoses     Medicare annual wellness visit, subsequent    -  Primary    Onychomycosis due to dermatophyte        Relevant Medications    ciclopirox (Penlac) 8 % solution            Current medication list contains high risk medications. No harmful drug interactions have been identified.  Plan of action: Continued monitoring  Follow Up:  Return in about 6 months (around 3/3/2022).   An  After Visit Summary and PPPS with all of these plans were given to the patient.

## 2021-09-29 DIAGNOSIS — G89.29 CHRONIC BILATERAL LOW BACK PAIN WITHOUT SCIATICA: ICD-10-CM

## 2021-09-29 DIAGNOSIS — M54.50 CHRONIC BILATERAL LOW BACK PAIN WITHOUT SCIATICA: ICD-10-CM

## 2021-09-29 RX ORDER — TIZANIDINE 4 MG/1
TABLET ORAL
Qty: 90 TABLET | Refills: 1 | Status: SHIPPED | OUTPATIENT
Start: 2021-09-29 | End: 2021-10-25

## 2021-10-24 DIAGNOSIS — M54.50 CHRONIC BILATERAL LOW BACK PAIN WITHOUT SCIATICA: ICD-10-CM

## 2021-10-24 DIAGNOSIS — G89.29 CHRONIC BILATERAL LOW BACK PAIN WITHOUT SCIATICA: ICD-10-CM

## 2021-10-25 RX ORDER — TIZANIDINE 4 MG/1
TABLET ORAL
Qty: 90 TABLET | Refills: 1 | Status: SHIPPED | OUTPATIENT
Start: 2021-10-25 | End: 2022-04-15

## 2021-11-20 DIAGNOSIS — G89.29 CHRONIC BILATERAL LOW BACK PAIN WITHOUT SCIATICA: ICD-10-CM

## 2021-11-20 DIAGNOSIS — M54.50 CHRONIC BILATERAL LOW BACK PAIN WITHOUT SCIATICA: ICD-10-CM

## 2021-11-22 RX ORDER — TIZANIDINE 4 MG/1
TABLET ORAL
Qty: 90 TABLET | Refills: 1 | OUTPATIENT
Start: 2021-11-22

## 2022-01-23 DIAGNOSIS — I10 BENIGN ESSENTIAL HYPERTENSION: ICD-10-CM

## 2022-01-24 RX ORDER — INDAPAMIDE 1.25 MG/1
TABLET, FILM COATED ORAL
Qty: 90 TABLET | Refills: 0 | Status: SHIPPED | OUTPATIENT
Start: 2022-01-24 | End: 2022-04-15 | Stop reason: SDUPTHER

## 2022-03-05 DIAGNOSIS — I10 BENIGN ESSENTIAL HYPERTENSION: ICD-10-CM

## 2022-03-07 RX ORDER — LOSARTAN POTASSIUM 25 MG/1
TABLET ORAL
Qty: 90 TABLET | Refills: 0 | Status: SHIPPED | OUTPATIENT
Start: 2022-03-07 | End: 2022-03-31

## 2022-03-31 DIAGNOSIS — I10 BENIGN ESSENTIAL HYPERTENSION: ICD-10-CM

## 2022-03-31 RX ORDER — LOSARTAN POTASSIUM 25 MG/1
TABLET ORAL
Qty: 30 TABLET | Refills: 0 | Status: SHIPPED | OUTPATIENT
Start: 2022-03-31 | End: 2022-04-15 | Stop reason: SDUPTHER

## 2022-04-12 DIAGNOSIS — E78.2 MIXED HYPERLIPIDEMIA: Primary | ICD-10-CM

## 2022-04-12 DIAGNOSIS — R53.83 FATIGUE, UNSPECIFIED TYPE: Primary | ICD-10-CM

## 2022-04-12 DIAGNOSIS — Z79.899 HIGH RISK MEDICATION USE: ICD-10-CM

## 2022-04-13 LAB
ALBUMIN SERPL-MCNC: 4.6 G/DL (ref 3.8–4.8)
ALBUMIN/GLOB SERPL: 2 {RATIO} (ref 1.2–2.2)
ALP SERPL-CCNC: 104 IU/L (ref 44–121)
ALT SERPL-CCNC: 52 IU/L (ref 0–44)
AST SERPL-CCNC: 27 IU/L (ref 0–40)
BILIRUB SERPL-MCNC: 0.9 MG/DL (ref 0–1.2)
BUN SERPL-MCNC: 20 MG/DL (ref 8–27)
BUN/CREAT SERPL: 18 (ref 10–24)
CALCIUM SERPL-MCNC: 9.9 MG/DL (ref 8.6–10.2)
CHLORIDE SERPL-SCNC: 96 MMOL/L (ref 96–106)
CHOLEST SERPL-MCNC: 184 MG/DL (ref 100–199)
CO2 SERPL-SCNC: 25 MMOL/L (ref 20–29)
CREAT SERPL-MCNC: 1.1 MG/DL (ref 0.76–1.27)
EGFRCR SERPLBLD CKD-EPI 2021: 73 ML/MIN/1.73
GLOBULIN SER CALC-MCNC: 2.3 G/DL (ref 1.5–4.5)
GLUCOSE SERPL-MCNC: 101 MG/DL (ref 65–99)
HDLC SERPL-MCNC: 43 MG/DL
LDLC SERPL CALC-MCNC: 125 MG/DL (ref 0–99)
LDLC/HDLC SERPL: 2.9 RATIO (ref 0–3.6)
POTASSIUM SERPL-SCNC: 3.9 MMOL/L (ref 3.5–5.2)
PROT SERPL-MCNC: 6.9 G/DL (ref 6–8.5)
SODIUM SERPL-SCNC: 137 MMOL/L (ref 134–144)
TESTOST SERPL-MCNC: 498 NG/DL (ref 264–916)
TRIGL SERPL-MCNC: 88 MG/DL (ref 0–149)
TSH SERPL DL<=0.005 MIU/L-ACNC: 2.98 UIU/ML (ref 0.45–4.5)
VLDLC SERPL CALC-MCNC: 16 MG/DL (ref 5–40)

## 2022-04-15 ENCOUNTER — OFFICE VISIT (OUTPATIENT)
Dept: FAMILY MEDICINE CLINIC | Facility: CLINIC | Age: 67
End: 2022-04-15

## 2022-04-15 VITALS
SYSTOLIC BLOOD PRESSURE: 120 MMHG | HEIGHT: 72 IN | BODY MASS INDEX: 24.79 KG/M2 | HEART RATE: 71 BPM | RESPIRATION RATE: 18 BRPM | WEIGHT: 183 LBS | OXYGEN SATURATION: 98 % | DIASTOLIC BLOOD PRESSURE: 70 MMHG

## 2022-04-15 DIAGNOSIS — M54.50 CHRONIC BILATERAL LOW BACK PAIN WITHOUT SCIATICA: ICD-10-CM

## 2022-04-15 DIAGNOSIS — Z82.49 FAMILY HISTORY OF EARLY CAD: ICD-10-CM

## 2022-04-15 DIAGNOSIS — G89.29 CHRONIC BILATERAL LOW BACK PAIN WITHOUT SCIATICA: ICD-10-CM

## 2022-04-15 DIAGNOSIS — E78.2 MIXED HYPERLIPIDEMIA: ICD-10-CM

## 2022-04-15 DIAGNOSIS — B35.1 ONYCHOMYCOSIS DUE TO DERMATOPHYTE: ICD-10-CM

## 2022-04-15 DIAGNOSIS — I10 BENIGN ESSENTIAL HYPERTENSION: ICD-10-CM

## 2022-04-15 DIAGNOSIS — Z23 NEED FOR VACCINATION: Primary | ICD-10-CM

## 2022-04-15 PROCEDURE — 0051A COVID-19 (PFIZER) 12+ YRS: CPT | Performed by: FAMILY MEDICINE

## 2022-04-15 PROCEDURE — 99214 OFFICE O/P EST MOD 30 MIN: CPT | Performed by: FAMILY MEDICINE

## 2022-04-15 PROCEDURE — 91305 COVID-19 (PFIZER) 12+ YRS: CPT | Performed by: FAMILY MEDICINE

## 2022-04-15 RX ORDER — INDAPAMIDE 1.25 MG/1
1.25 TABLET, FILM COATED ORAL DAILY
Qty: 90 TABLET | Refills: 1 | Status: SHIPPED | OUTPATIENT
Start: 2022-04-15 | End: 2022-09-16 | Stop reason: SDUPTHER

## 2022-04-15 RX ORDER — TIZANIDINE HYDROCHLORIDE 4 MG/1
4 CAPSULE, GELATIN COATED ORAL 3 TIMES DAILY
Qty: 90 CAPSULE | Refills: 1 | Status: SHIPPED | OUTPATIENT
Start: 2022-04-15 | End: 2022-05-09

## 2022-04-15 RX ORDER — TIZANIDINE 4 MG/1
4 TABLET ORAL 3 TIMES DAILY
Qty: 90 TABLET | Refills: 1 | Status: SHIPPED | OUTPATIENT
Start: 2022-04-15 | End: 2022-04-15

## 2022-04-15 RX ORDER — LOSARTAN POTASSIUM 25 MG/1
25 TABLET ORAL DAILY
Qty: 90 TABLET | Refills: 1 | Status: SHIPPED | OUTPATIENT
Start: 2022-04-15 | End: 2022-08-30 | Stop reason: SDUPTHER

## 2022-04-15 NOTE — PROGRESS NOTES
Assessment and Plan     Problem List Items Addressed This Visit        Cardiac and Vasculature    Benign essential hypertension    Overview     [facility]  BP is controlled well. He will recheck in six months. He will continue present meds. Prescription is sent today. .             Relevant Medications    indapamide (LOZOL) 1.25 MG tablet    losartan (COZAAR) 25 MG tablet    Hyperlipidemia    Overview     Otto 4/15/2022  He will double the pravastatin  Lab Results   Component Value Date    HDL 43 04/12/2022    HDL 50 09/01/2021    HDL 49 07/23/2020     (H) 04/12/2022     (H) 09/01/2021               Relevant Orders    CT Cardiac Calcium Score Without Dye       Musculoskeletal and Injuries    Back pain      Other Visit Diagnoses     Need for vaccination    -  Primary    Relevant Orders    COVID-19 Vaccine (Pfizer) Gray Cap (Completed)    Family history of early CAD        Relevant Orders    CT Cardiac Calcium Score Without Dye    Onychomycosis due to dermatophyte        Relevant Medications    ciclopirox (Penlac) 8 % solution        Return in about 6 months (around 10/15/2022).    The 10-year ASCVD risk score (Arash BAN Jr., et al., 2013) is: 16.9%    Values used to calculate the score:      Age: 66 years      Sex: Male      Is Non- : No      Diabetic: No      Tobacco smoker: No      Systolic Blood Pressure: 120 mmHg      Is BP treated: Yes      HDL Cholesterol: 43 mg/dL      Total Cholesterol: 184 mg/dL    Patient was given instructions and counseling regarding his condition or for health maintenance advice. Please see specific information pulled into the AVS if appropriate.        Emery is a 66 y.o. being seen today for  Hypertension, Hyperlipidemia, and Insomnia   Subjective   History of the Present Illness   No complaints.  Taking meds as ordered.  No headache, chest pain or shortness of breath.   What is the primary reason for your visit?: Other  Please describe  your symptoms.: No symptoms.  Statin dose was increased last Fall, so this is a follow-up to check lipid levels and liver enzymes.  Also need a 6-month follow-up for BP and associated meds.  Have you had these symptoms before?: No  How long have you been having these symptoms?: 1-4 days  Please list any medications you are currently taking for this condition.: Pravastatin 20mg once daily, Indapamide 1.25mg once daily, Losartan 25mg 3x daily  Please describe any probable cause for these symptoms. : No symptoms (had no choice but to gregorio 1-4 days above)  Social History  He  reports that he has never smoked. He has never used smokeless tobacco. He reports current alcohol use of about 14.0 standard drinks of alcohol per week. He reports that he does not use drugs.  Objective   Vital Signs        BP Readings from Last 1 Encounters:   04/15/22 120/70     Wt Readings from Last 3 Encounters:   04/15/22 83 kg (183 lb)   09/03/21 80.7 kg (178 lb)   03/05/21 81.2 kg (179 lb)   Body mass index is 24.82 kg/m².     Physical Exam  Vitals reviewed.   Constitutional:       Appearance: Normal appearance.   HENT:      Head:      Comments: Mask in place.  Cardiovascular:      Rate and Rhythm: Normal rate and regular rhythm.      Heart sounds: No murmur heard.  Pulmonary:      Effort: Pulmonary effort is normal.      Breath sounds: Normal breath sounds. No wheezing.   Neurological:      Mental Status: He is alert and oriented to person, place, and time.   Psychiatric:         Mood and Affect: Mood normal.         Behavior: Behavior normal.         Thought Content: Thought content normal.         Judgment: Judgment normal.

## 2022-04-21 ENCOUNTER — TRANSCRIBE ORDERS (OUTPATIENT)
Dept: ADMINISTRATIVE | Facility: HOSPITAL | Age: 67
End: 2022-04-21

## 2022-04-21 DIAGNOSIS — Z13.6 ENCOUNTER FOR SCREENING FOR VASCULAR DISEASE: Primary | ICD-10-CM

## 2022-05-07 DIAGNOSIS — G89.29 CHRONIC BILATERAL LOW BACK PAIN WITHOUT SCIATICA: ICD-10-CM

## 2022-05-07 DIAGNOSIS — M54.50 CHRONIC BILATERAL LOW BACK PAIN WITHOUT SCIATICA: ICD-10-CM

## 2022-05-09 RX ORDER — TIZANIDINE HYDROCHLORIDE 4 MG/1
CAPSULE, GELATIN COATED ORAL
Qty: 90 CAPSULE | Refills: 1 | Status: SHIPPED | OUTPATIENT
Start: 2022-05-09 | End: 2022-05-20 | Stop reason: SDUPTHER

## 2022-05-09 RX ORDER — TIZANIDINE 4 MG/1
TABLET ORAL
Qty: 90 TABLET | Refills: 1 | Status: SHIPPED | OUTPATIENT
Start: 2022-05-09 | End: 2022-06-06

## 2022-05-16 ENCOUNTER — HOSPITAL ENCOUNTER (OUTPATIENT)
Dept: CT IMAGING | Facility: HOSPITAL | Age: 67
Discharge: HOME | End: 2022-05-16

## 2022-05-16 ENCOUNTER — APPOINTMENT (OUTPATIENT)
Dept: CARDIOLOGY | Facility: HOSPITAL | Age: 67
End: 2022-05-16

## 2022-05-16 ENCOUNTER — HOSPITAL ENCOUNTER (OUTPATIENT)
Dept: CARDIOLOGY | Facility: HOSPITAL | Age: 67
Discharge: HOME | End: 2022-05-16

## 2022-05-16 DIAGNOSIS — Z82.49 FAMILY HISTORY OF EARLY CAD: ICD-10-CM

## 2022-05-16 DIAGNOSIS — E78.2 MIXED HYPERLIPIDEMIA: ICD-10-CM

## 2022-05-16 DIAGNOSIS — Z13.6 ENCOUNTER FOR SCREENING FOR VASCULAR DISEASE: ICD-10-CM

## 2022-05-16 PROCEDURE — 93799 UNLISTED CV SVC/PROCEDURE: CPT

## 2022-05-16 PROCEDURE — 75571 CT HRT W/O DYE W/CA TEST: CPT

## 2022-05-18 LAB
BH CV XLRA MEAS - MID AO DIAM: 2.2 CM
BH CV XLRA MEAS - PAD LEFT ABI PT: 1.16
BH CV XLRA MEAS - PAD LEFT ARM: 138 MMHG
BH CV XLRA MEAS - PAD LEFT LEG PT: 170 MMHG
BH CV XLRA MEAS - PAD RIGHT ABI PT: 1.12
BH CV XLRA MEAS - PAD RIGHT ARM: 146 MMHG
BH CV XLRA MEAS - PAD RIGHT LEG PT: 163 MMHG
BH CV XLRA MEAS LEFT DIST CCA EDV: -26.1 CM/SEC
BH CV XLRA MEAS LEFT DIST CCA PSV: -102 CM/SEC
BH CV XLRA MEAS LEFT ICA/CCA RATIO: 1
BH CV XLRA MEAS LEFT MID CCA PSV: 102 CM/SEC
BH CV XLRA MEAS LEFT MID ICA PSV: 103 CM/SEC
BH CV XLRA MEAS LEFT PROX ICA EDV: 26.7 CM/SEC
BH CV XLRA MEAS LEFT PROX ICA PSV: 103 CM/SEC
BH CV XLRA MEAS RIGHT DIST CCA EDV: 29.2 CM/SEC
BH CV XLRA MEAS RIGHT DIST CCA PSV: 115 CM/SEC
BH CV XLRA MEAS RIGHT ICA/CCA RATIO: 0.7
BH CV XLRA MEAS RIGHT MID CCA PSV: 115 CM/SEC
BH CV XLRA MEAS RIGHT MID ICA PSV: 82 CM/SEC
BH CV XLRA MEAS RIGHT PROX ICA EDV: -22.3 CM/SEC
BH CV XLRA MEAS RIGHT PROX ICA PSV: -81.7 CM/SEC
MAXIMAL PREDICTED HEART RATE: 152 BPM
STRESS TARGET HR: 129 BPM

## 2022-05-20 ENCOUNTER — OFFICE VISIT (OUTPATIENT)
Dept: FAMILY MEDICINE CLINIC | Facility: CLINIC | Age: 67
End: 2022-05-20

## 2022-05-20 VITALS
RESPIRATION RATE: 14 BRPM | DIASTOLIC BLOOD PRESSURE: 84 MMHG | BODY MASS INDEX: 23.57 KG/M2 | HEART RATE: 80 BPM | HEIGHT: 72 IN | SYSTOLIC BLOOD PRESSURE: 128 MMHG | OXYGEN SATURATION: 98 % | WEIGHT: 174 LBS

## 2022-05-20 DIAGNOSIS — Z12.11 ENCOUNTER FOR SCREENING FOR MALIGNANT NEOPLASM OF COLON: ICD-10-CM

## 2022-05-20 DIAGNOSIS — I10 BENIGN ESSENTIAL HYPERTENSION: ICD-10-CM

## 2022-05-20 DIAGNOSIS — R93.1 AGATSTON CORONARY ARTERY CALCIUM SCORE GREATER THAN 400: ICD-10-CM

## 2022-05-20 DIAGNOSIS — E78.2 MIXED HYPERLIPIDEMIA: Primary | ICD-10-CM

## 2022-05-20 PROCEDURE — 99214 OFFICE O/P EST MOD 30 MIN: CPT | Performed by: FAMILY MEDICINE

## 2022-05-20 RX ORDER — ROSUVASTATIN CALCIUM 40 MG/1
40 TABLET, COATED ORAL DAILY
Qty: 90 TABLET | Refills: 1 | Status: SHIPPED | OUTPATIENT
Start: 2022-05-20 | End: 2022-11-16

## 2022-05-20 NOTE — PROGRESS NOTES
Assessment and Plan     Problem List Items Addressed This Visit        Cardiac and Vasculature    Agatston coronary artery calcium score greater than 400    Overview     Kallie 5/20/2022  Recommended moving to a higher dose and more potent statin. Also suggested, if he can tolerate it, a plant-based diet like Dr. Gomez.           Relevant Orders    Ambulatory Referral to Cardiology    Benign essential hypertension    Overview     Kallie 5/20/2022  BP is controlled well. He will recheck in six months. He will continue present meds. Prescription is sent today. .             Hyperlipidemia - Primary    Overview     Otto 5/20/2022  He will switch to crestor at 38 and repeat the labs in three months.   Lab Results   Component Value Date    HDL 43 04/12/2022    HDL 50 09/01/2021    HDL 49 07/23/2020     (H) 04/12/2022     (H) 09/01/2021               Relevant Medications    rosuvastatin (Crestor) 40 MG tablet    Other Relevant Orders    Lipid Panel With LDL / HDL Ratio      Other Visit Diagnoses     Encounter for screening for malignant neoplasm of colon        Relevant Orders    Cologuard - Stool, Per Rectum        Return in about 3 months (around 8/20/2022) for for labs.    Patient was given instructions and counseling regarding his condition or for health maintenance advice. Please see specific information pulled into the AVS if appropriate.        Emery is a 66 y.o. being seen today for  Hypertension and Hyperlipidemia   Subjective   History of the Present Illness   Answers for HPI/ROS submitted by the patient on 5/19/2022  What is the primary reason for your visit?: Other  Please describe your symptoms.: Coronary calcium score of 432. Dr. Villa recommended this test to help determine future course of statin treatment.  Have you had these symptoms before?: Yes  How long have you been having these symptoms?: Greater than 2 weeks  Please list any medications you are currently taking for this  condition.: Pravastatin 20mg daily  Please describe any probable cause for these symptoms. : Hereditary.  Diet, weight, and exercise don’t appear to be enough to prevent continued accumulation of arterial plaque.  Based on past discussions with Dr. Villa, I am anticipating that she will want me on a more potent statin to signigicantly lower LDL.    Social History  He  reports that he has never smoked. He has never used smokeless tobacco. He reports current alcohol use of about 14.0 standard drinks of alcohol per week. He reports that he does not use drugs.  Objective   Vital Signs        BP Readings from Last 1 Encounters:   05/20/22 128/84     Wt Readings from Last 3 Encounters:   05/20/22 78.9 kg (174 lb)   04/15/22 83 kg (183 lb)   09/03/21 80.7 kg (178 lb)   Body mass index is 23.6 kg/m².     Physical Exam  Vitals reviewed.   Constitutional:       Appearance: Normal appearance. He is well-developed.   Neurological:      Mental Status: He is alert.   Psychiatric:         Behavior: Behavior normal.         Thought Content: Thought content normal.         Judgment: Judgment normal.

## 2022-05-26 ENCOUNTER — APPOINTMENT (OUTPATIENT)
Dept: CT IMAGING | Facility: HOSPITAL | Age: 67
End: 2022-05-26

## 2022-06-04 DIAGNOSIS — G89.29 CHRONIC BILATERAL LOW BACK PAIN WITHOUT SCIATICA: ICD-10-CM

## 2022-06-04 DIAGNOSIS — M54.50 CHRONIC BILATERAL LOW BACK PAIN WITHOUT SCIATICA: ICD-10-CM

## 2022-06-06 RX ORDER — TIZANIDINE 4 MG/1
TABLET ORAL
Qty: 90 TABLET | Refills: 1 | Status: SHIPPED | OUTPATIENT
Start: 2022-06-06 | End: 2022-07-05

## 2022-06-24 ENCOUNTER — APPOINTMENT (OUTPATIENT)
Dept: CT IMAGING | Facility: HOSPITAL | Age: 67
End: 2022-06-24

## 2022-07-02 DIAGNOSIS — M54.50 CHRONIC BILATERAL LOW BACK PAIN WITHOUT SCIATICA: ICD-10-CM

## 2022-07-02 DIAGNOSIS — G89.29 CHRONIC BILATERAL LOW BACK PAIN WITHOUT SCIATICA: ICD-10-CM

## 2022-07-05 RX ORDER — TIZANIDINE 4 MG/1
TABLET ORAL
Qty: 90 TABLET | Refills: 1 | Status: SHIPPED | OUTPATIENT
Start: 2022-07-05 | End: 2023-01-04

## 2022-08-09 ENCOUNTER — APPOINTMENT (OUTPATIENT)
Dept: CARDIOLOGY | Facility: HOSPITAL | Age: 67
End: 2022-08-09

## 2022-08-19 DIAGNOSIS — E78.2 MIXED HYPERLIPIDEMIA: Primary | ICD-10-CM

## 2022-08-19 LAB
CHOLEST SERPL-MCNC: 145 MG/DL (ref 100–199)
HDLC SERPL-MCNC: 47 MG/DL
LDLC SERPL CALC-MCNC: 86 MG/DL (ref 0–99)
LDLC/HDLC SERPL: 1.8 RATIO (ref 0–3.6)
TRIGL SERPL-MCNC: 60 MG/DL (ref 0–149)
VLDLC SERPL CALC-MCNC: 12 MG/DL (ref 5–40)

## 2022-08-30 ENCOUNTER — PATIENT MESSAGE (OUTPATIENT)
Dept: FAMILY MEDICINE CLINIC | Facility: CLINIC | Age: 67
End: 2022-08-30

## 2022-08-30 DIAGNOSIS — I10 BENIGN ESSENTIAL HYPERTENSION: ICD-10-CM

## 2022-08-30 RX ORDER — LOSARTAN POTASSIUM 25 MG/1
25 TABLET ORAL 3 TIMES DAILY
Qty: 270 TABLET | Refills: 1 | Status: SHIPPED | OUTPATIENT
Start: 2022-08-30 | End: 2022-10-07

## 2022-08-30 NOTE — TELEPHONE ENCOUNTER
From: Emery Kincaid  To: Marjorie Villa MD  Sent: 8/30/2022 7:03 AM EDT  Subject: Losartan Dosage    Hi Dr. Villa,  This morning I realized I am getting low on Losartan. After some research of the prescription refills and the last visit summary from 4-15-22, it appears the “three times daily” was inadvertently dropped off of the prescription. I was on 25mg 3x daily up until that time which appeared to be working well. I was going to wait to address this at my upcoming Sept. 16 physical, but I will run out of Losartan by Friday using three per day. Even just a temporary refill to hold me until my visit will help.  Thanks,  Emery Kincaid

## 2022-09-15 NOTE — PROGRESS NOTES
"Subsequent Medicare Wellness Visit    Chief Complaint   Patient presents with   • Annual Exam      Subjective    History of Present Illness:  Emery Kincaid is a 66 y.o. male who presents for a Subsequent Medicare Wellness Visit.  He has had insomnia issues for years and would be interested in trying a daridorexant (new sleep med).     Compared to one year ago, the patient feels his physical health is the same and his mental health is the same.  Recent Hospitalizations:  He was not admitted to the hospital during the last year.     Current Medical Providers:  Patient Care Team:  Marjorie Villa MD as PCP - General (Family Medicine)  No opioid medication identified on active medication list. I have reviewed chart for other potential  high risk medication/s and harmful drug interactions in the elderly.    Aspirin is on active medication list. Aspirin use is indicated based on review of current medical condition/s. Benefits of this medication outweigh potential harm.   Advance Care Planning   Advance Directive is not on file.  ACP discussion was held with the patient during this visit. Patient has an advance directive (not in EMR), copy requested.  Objective    /72   Pulse 73   Resp 18   Ht 182.9 cm (72\")   Wt 80.3 kg (177 lb)   SpO2 98%   BMI 24.01 kg/m²   Body mass index is 24.01 kg/m².  BMI Readings from Last 1 Encounters:   09/16/22 24.01 kg/m²   BMI is within normal parameters. No follow-up required.    Does the patient have evidence of cognitive impairment? No  Physical Exam  Vitals reviewed.   Constitutional:       Appearance: Normal appearance.   HENT:      Head:      Comments: Mask in place.  Cardiovascular:      Rate and Rhythm: Normal rate and regular rhythm.      Heart sounds: No murmur heard.  Pulmonary:      Effort: Pulmonary effort is normal.      Breath sounds: Normal breath sounds. No wheezing.   Neurological:      Mental Status: He is alert and oriented to person, place, and time. "   Psychiatric:         Mood and Affect: Mood normal.         Behavior: Behavior normal.         Thought Content: Thought content normal.         Judgment: Judgment normal.       LABS:Comprehensive Metabolic Panel (09/13/2022 07:56)  Lipid Panel With LDL / HDL Ratio (08/18/2022 09:24)  HEALTH RISK ASSESSMENT  Smoking Status:  Social History     Tobacco Use   Smoking Status Never Smoker   Smokeless Tobacco Never Used     Alcohol Consumption:  Social History     Substance and Sexual Activity   Alcohol Use Yes   • Alcohol/week: 14.0 standard drinks   • Types: 7 Glasses of wine, 7 Cans of beer per week    Comment: 1-2 per day     Fall Risk Screen:  TIFFANIADI Fall Risk Assessment was completed, and patient is at LOW risk for falls.Assessment completed on:9/16/2022  Depression Screening:  PHQ-2/PHQ-9 Depression Screening 9/16/2022   Retired PHQ-9 Total Score -   Retired Total Score -   Little Interest or Pleasure in Doing Things 0-->not at all   Feeling Down, Depressed or Hopeless 0-->not at all   PHQ-9: Brief Depression Severity Measure Score 0     Health Habits and Functional and Cognitive Screening:  Functional & Cognitive Status 9/16/2022   Do you have difficulty preparing food and eating? No   Do you have difficulty bathing yourself, getting dressed or grooming yourself? No   Do you have difficulty using the toilet? No   Do you have difficulty moving around from place to place? No   Do you have trouble with steps or getting out of a bed or a chair? No   Current Diet Well Balanced Diet   Dental Exam Up to date   Eye Exam Up to date   Exercise (times per week) 3 times per week   Current Exercises Include Walking;Yard Work;Weightlifting   Current Exercise Activities Include -   Do you need help using the phone?  No   Are you deaf or do you have serious difficulty hearing?  No   Do you need help with transportation? No   Do you need help shopping? No   Do you need help preparing meals?  No   Do you need help with housework?   No   Do you need help with laundry? No   Do you need help taking your medications? No   Do you need help managing money? No   Do you ever drive or ride in a car without wearing a seat belt? No   Have you felt unusual stress, anger or loneliness in the last month? No   Who do you live with? Alone   If you need help, do you have trouble finding someone available to you? No   Have you been bothered in the last four weeks by sexual problems? No   Do you have difficulty concentrating, remembering or making decisions? No       Health Habits  Current Diet: Well Balanced Diet  Dental Exam: Up to date  Eye Exam: Up to date  Exercise (times per week): 3 times per week  Current Exercises Include: Walking, Yard Work, Weightlifting  Age-appropriate Screening Schedule:  Refer to the list below for future screening recommendations based on patient's age, sex and/or medical conditions. Orders for these recommended tests are listed in the plan section. The patient has been provided with a written plan.  Health Maintenance   Topic Date Due   • INFLUENZA VACCINE  10/01/2022   • LIPID PANEL  08/18/2023   • TDAP/TD VACCINES (3 - Td or Tdap) 12/03/2025   • ZOSTER VACCINE  Completed          Assessment & Plan   CMS Preventative Services Quick Reference  Risk Factors Identified During Encounter  Cardiovascular Disease He has a coronary artery calcium score > 400 and is maximized with his medications and exercise (lifestyle).   The above risks/problems have been discussed with the patient.  Follow up actions/plans if indicated are seen below in the Assessment/Plan Section.  Pertinent information has been shared with the patient in the After Visit Summary.  Patient Instructions    Problem List Items Addressed This Visit        Cardiac and Vasculature    Agatston coronary artery calcium score greater than 400    Mary Arreguin 5/20/2022  Recommended moving to a higher dose and more potent statin. Also suggested, if he can tolerate it,  a plant-based diet like Dr. Gomez.         Benign essential hypertension    Overview     Otto 9/16/2022  BP is controlled well. He will recheck in six months. He will continue present meds. Prescription is sent today. .           Relevant Medications    indapamide (LOZOL) 1.25 MG tablet    Hyperlipidemia    [facility]     [facility]  On Crestor at 38         Current Assessment & Plan     Lab Results   Component Value Date    CHLPL 145 08/18/2022    LDL 86 08/18/2022    HDL 47 08/18/2022    TRIG 60 08/18/2022               Other Visit Diagnoses     Welcome to Medicare preventive visit    -  Primary    Primary insomnia        Relevant Medications    Daridorexant HCl 25 MG tablet    Elevated PSA        Relevant Orders    PSA DIAGNOSTIC             Follow Up:   Return in about 6 months (around 3/16/2023).   An After Visit Summary and PPPS were given/sent to the patient.

## 2022-09-16 ENCOUNTER — TELEPHONE (OUTPATIENT)
Dept: FAMILY MEDICINE CLINIC | Facility: CLINIC | Age: 67
End: 2022-09-16

## 2022-09-16 ENCOUNTER — OFFICE VISIT (OUTPATIENT)
Dept: FAMILY MEDICINE CLINIC | Facility: CLINIC | Age: 67
End: 2022-09-16

## 2022-09-16 VITALS
HEIGHT: 72 IN | WEIGHT: 177 LBS | DIASTOLIC BLOOD PRESSURE: 72 MMHG | SYSTOLIC BLOOD PRESSURE: 140 MMHG | BODY MASS INDEX: 23.98 KG/M2 | HEART RATE: 73 BPM | RESPIRATION RATE: 18 BRPM | OXYGEN SATURATION: 98 %

## 2022-09-16 DIAGNOSIS — E78.2 MIXED HYPERLIPIDEMIA: ICD-10-CM

## 2022-09-16 DIAGNOSIS — R97.20 ELEVATED PSA: ICD-10-CM

## 2022-09-16 DIAGNOSIS — I10 BENIGN ESSENTIAL HYPERTENSION: ICD-10-CM

## 2022-09-16 DIAGNOSIS — F51.01 PRIMARY INSOMNIA: ICD-10-CM

## 2022-09-16 DIAGNOSIS — R93.1 AGATSTON CORONARY ARTERY CALCIUM SCORE GREATER THAN 400: ICD-10-CM

## 2022-09-16 DIAGNOSIS — Z00.00 WELCOME TO MEDICARE PREVENTIVE VISIT: Primary | ICD-10-CM

## 2022-09-16 PROBLEM — R73.9 HYPERGLYCEMIA: Status: ACTIVE | Noted: 2022-09-16

## 2022-09-16 PROCEDURE — G0402 INITIAL PREVENTIVE EXAM: HCPCS | Performed by: FAMILY MEDICINE

## 2022-09-16 PROCEDURE — 1170F FXNL STATUS ASSESSED: CPT | Performed by: FAMILY MEDICINE

## 2022-09-16 PROCEDURE — 1160F RVW MEDS BY RX/DR IN RCRD: CPT | Performed by: FAMILY MEDICINE

## 2022-09-16 RX ORDER — INDAPAMIDE 1.25 MG/1
1.25 TABLET, FILM COATED ORAL DAILY
Qty: 90 TABLET | Refills: 1 | Status: SHIPPED | OUTPATIENT
Start: 2022-09-16 | End: 2023-03-15 | Stop reason: SDUPTHER

## 2022-09-16 NOTE — ASSESSMENT & PLAN NOTE
Lab Results   Component Value Date    CHLPL 145 08/18/2022    LDL 86 08/18/2022    HDL 47 08/18/2022    TRIG 60 08/18/2022

## 2022-09-16 NOTE — TELEPHONE ENCOUNTER
At checkout patient wanted to know if Dr. Villa was going to add the A1C check to his lab order for today. Patient stated that she mentioned this during his visit today.

## 2022-09-17 LAB — PSA SERPL-MCNC: 1.9 NG/ML (ref 0–4)

## 2022-10-07 DIAGNOSIS — I10 BENIGN ESSENTIAL HYPERTENSION: ICD-10-CM

## 2022-10-07 RX ORDER — LOSARTAN POTASSIUM 25 MG/1
TABLET ORAL
Qty: 90 TABLET | Refills: 1 | Status: SHIPPED | OUTPATIENT
Start: 2022-10-07 | End: 2023-03-15 | Stop reason: SDUPTHER

## 2022-11-16 DIAGNOSIS — E78.2 MIXED HYPERLIPIDEMIA: ICD-10-CM

## 2022-11-16 RX ORDER — ROSUVASTATIN CALCIUM 40 MG/1
TABLET, COATED ORAL
Qty: 90 TABLET | Refills: 1 | Status: SHIPPED | OUTPATIENT
Start: 2022-11-16 | End: 2023-03-15 | Stop reason: SDUPTHER

## 2023-01-03 DIAGNOSIS — G89.29 CHRONIC BILATERAL LOW BACK PAIN WITHOUT SCIATICA: ICD-10-CM

## 2023-01-03 DIAGNOSIS — M54.50 CHRONIC BILATERAL LOW BACK PAIN WITHOUT SCIATICA: ICD-10-CM

## 2023-01-04 RX ORDER — TIZANIDINE 4 MG/1
TABLET ORAL
Qty: 90 TABLET | Refills: 1 | Status: SHIPPED | OUTPATIENT
Start: 2023-01-04

## 2023-01-31 DIAGNOSIS — G89.29 CHRONIC BILATERAL LOW BACK PAIN WITHOUT SCIATICA: ICD-10-CM

## 2023-01-31 DIAGNOSIS — M54.50 CHRONIC BILATERAL LOW BACK PAIN WITHOUT SCIATICA: ICD-10-CM

## 2023-01-31 RX ORDER — TIZANIDINE 4 MG/1
TABLET ORAL
Qty: 90 TABLET | Refills: 1 | OUTPATIENT
Start: 2023-01-31

## 2023-03-08 ENCOUNTER — PATIENT MESSAGE (OUTPATIENT)
Dept: FAMILY MEDICINE CLINIC | Facility: CLINIC | Age: 68
End: 2023-03-08
Payer: MEDICARE

## 2023-03-08 DIAGNOSIS — I10 BENIGN ESSENTIAL HYPERTENSION: ICD-10-CM

## 2023-03-08 DIAGNOSIS — R97.20 ELEVATED PSA: Primary | ICD-10-CM

## 2023-03-08 DIAGNOSIS — E78.2 MIXED HYPERLIPIDEMIA: ICD-10-CM

## 2023-03-08 DIAGNOSIS — R73.9 HYPERGLYCEMIA: ICD-10-CM

## 2023-03-08 NOTE — TELEPHONE ENCOUNTER
From: Emery Kincaid  To: Marjorie Villa  Sent: 3/8/2023 12:08 PM EST  Subject: Blood Test    Hi Dr. Villa,  I have an appointment with you next Wednesday March 15 for a BP and statin follow-up. I requested a lab appointment for early Monday morning for a blood draw. I am hoping that it will include glucose, full lipid panel, liver enzymes, and PSA, especially since some of these were high last time and I’m still new to a strong statin. Yesenia thought it would be best to check with you on this. I would be glad to pay the difference if insurance won’t cover it.  Thanks,  Emery Kincaid  [phone]

## 2023-03-14 NOTE — PROGRESS NOTES
SUBJECTIVE: Emery Kincaid is a 67 y.o. male seen for a follow up visit; he has hypertension and hyperlipidemia.  Answers for HPI/ROS submitted by the patient on 3/15/2023  What is the primary reason for your visit?: High Blood Pressure  Chronicity: chronic  Onset: more than 1 year ago  Progression since onset: unchanged  Condition status: controlled  anxiety: No  blurred vision: No  chest pain: No  headaches: No  malaise/fatigue: No  neck pain: No  orthopnea: No  palpitations: No  peripheral edema: No  PND: No  shortness of breath: No  sweats: No  Agents associated with hypertension: decongestants, NSAIDs  CAD risks: dyslipidemia, family history, smoking/tobacco exposure, stress  Compliance problems: no compliance problems    Patient Active Problem List   Diagnosis   • Hyperlipidemia   • Benign essential hypertension   • Back pain   • Degeneration of intervertebral disc of lumbar region   • Onychomycosis   • Agatston coronary artery calcium score greater than 400   • Hyperglycemia     System Review: Cardiovascular risk analysis - LDL goal is under 100  hypertension  hyperlipidemia  family history of premature CAD.    OBJECTIVE:  BP Readings from Last 1 Encounters:   03/15/23 122/62      Wt Readings from Last 3 Encounters:   03/15/23 72.6 kg (160 lb)   09/16/22 80.3 kg (177 lb)   05/20/22 78.9 kg (174 lb)    Body mass index is 21.7 kg/m².  Appearance: alert, well appearing, and in no distress.  General exam: CVS exam BP noted to be well controlled today in office, S1, S2 normal, no gallop, 1/6 SINGH only in LUSB, chest clear, no JVD, no HSM, no edema.  Lab review: labs are reviewed, up to date and normal.     ASSESSMENT:  hypertension well controlled -- he checks at home. Will take an extra losartan ,   hyperlipidemia well controlled  PLAN:  Current treatment plan is effective, no change in therapy.  Follow up: 6 months and as needed.  His PSA is stable.

## 2023-03-15 ENCOUNTER — OFFICE VISIT (OUTPATIENT)
Dept: FAMILY MEDICINE CLINIC | Facility: CLINIC | Age: 68
End: 2023-03-15
Payer: MEDICARE

## 2023-03-15 VITALS
BODY MASS INDEX: 21.67 KG/M2 | WEIGHT: 160 LBS | HEIGHT: 72 IN | OXYGEN SATURATION: 99 % | HEART RATE: 80 BPM | RESPIRATION RATE: 18 BRPM | SYSTOLIC BLOOD PRESSURE: 122 MMHG | DIASTOLIC BLOOD PRESSURE: 62 MMHG

## 2023-03-15 DIAGNOSIS — I10 BENIGN ESSENTIAL HYPERTENSION: Primary | ICD-10-CM

## 2023-03-15 DIAGNOSIS — E78.2 MIXED HYPERLIPIDEMIA: ICD-10-CM

## 2023-03-15 DIAGNOSIS — R73.9 HYPERGLYCEMIA: ICD-10-CM

## 2023-03-15 PROCEDURE — 3074F SYST BP LT 130 MM HG: CPT | Performed by: FAMILY MEDICINE

## 2023-03-15 PROCEDURE — 3078F DIAST BP <80 MM HG: CPT | Performed by: FAMILY MEDICINE

## 2023-03-15 PROCEDURE — 99214 OFFICE O/P EST MOD 30 MIN: CPT | Performed by: FAMILY MEDICINE

## 2023-03-15 RX ORDER — ROSUVASTATIN CALCIUM 40 MG/1
40 TABLET, COATED ORAL DAILY
Qty: 90 TABLET | Refills: 1 | Status: SHIPPED | OUTPATIENT
Start: 2023-03-15

## 2023-03-15 RX ORDER — LOSARTAN POTASSIUM 25 MG/1
25 TABLET ORAL DAILY
Qty: 90 TABLET | Refills: 1 | Status: SHIPPED | OUTPATIENT
Start: 2023-03-15

## 2023-03-15 RX ORDER — INDAPAMIDE 1.25 MG/1
1.25 TABLET, FILM COATED ORAL DAILY
Qty: 90 TABLET | Refills: 1 | Status: SHIPPED | OUTPATIENT
Start: 2023-03-15

## 2023-04-27 ENCOUNTER — PATIENT MESSAGE (OUTPATIENT)
Dept: FAMILY MEDICINE CLINIC | Facility: CLINIC | Age: 68
End: 2023-04-27
Payer: MEDICARE

## 2023-04-27 DIAGNOSIS — G47.00 INSOMNIA, UNSPECIFIED TYPE: Primary | ICD-10-CM

## 2023-04-28 RX ORDER — TIZANIDINE HYDROCHLORIDE 4 MG/1
CAPSULE, GELATIN COATED ORAL
Qty: 30 CAPSULE | Refills: 0 | Status: SHIPPED | OUTPATIENT
Start: 2023-04-28

## 2023-04-28 NOTE — TELEPHONE ENCOUNTER
From: Emery Kincaid  To: Marjorie Villa  Sent: 4/27/2023 6:09 PM EDT  Subject: Tizanidine Prescription    Hi Dr. Villa,  I just ordered the last refill of my 90 Tizanidine 4mg tablets from [facility]. For a renewal of the prescription, I was wondering if there is a way I could get 30 capsules in addition to a renewal of the 90 4mg tablets (perhaps at a later date).     I like the tablets for the ability to cut them into halves or quarters and keep my usage to a minimum. But it seems that the capsules may have a more delayed all-night effect for times when I need a bit more help (e.g. night before a trip). The last time we tried this, [facility] canceled the capsules because they didn’t want to take a chance I would use both at the same time (which of course I wouldn’t do). When I asked [facility] about this, I was told it’s possible for me to have both available if the prescription was specific enough. You probably know what she meant. If this doesn’t work, I don’t mind getting my next prescription for 30 days of capsules, then I can request another prescription from you later for tablets. Sorry for the complications.  Thanks very much,  Emery

## 2023-05-14 DIAGNOSIS — I10 BENIGN ESSENTIAL HYPERTENSION: ICD-10-CM

## 2023-05-15 RX ORDER — LOSARTAN POTASSIUM 25 MG/1
TABLET ORAL
Qty: 270 TABLET | Refills: 1 | Status: SHIPPED | OUTPATIENT
Start: 2023-05-15

## 2023-05-23 DIAGNOSIS — G47.00 INSOMNIA, UNSPECIFIED TYPE: ICD-10-CM

## 2023-05-23 DIAGNOSIS — M54.50 CHRONIC BILATERAL LOW BACK PAIN WITHOUT SCIATICA: ICD-10-CM

## 2023-05-23 DIAGNOSIS — G89.29 CHRONIC BILATERAL LOW BACK PAIN WITHOUT SCIATICA: ICD-10-CM

## 2023-05-23 RX ORDER — TIZANIDINE 4 MG/1
TABLET ORAL
Qty: 90 TABLET | Refills: 1 | Status: SHIPPED | OUTPATIENT
Start: 2023-05-23

## 2023-05-23 RX ORDER — TIZANIDINE HYDROCHLORIDE 4 MG/1
CAPSULE, GELATIN COATED ORAL
Qty: 30 CAPSULE | Refills: 0 | Status: SHIPPED | OUTPATIENT
Start: 2023-05-23

## 2023-06-17 DIAGNOSIS — G89.29 CHRONIC BILATERAL LOW BACK PAIN WITHOUT SCIATICA: ICD-10-CM

## 2023-06-17 DIAGNOSIS — M54.50 CHRONIC BILATERAL LOW BACK PAIN WITHOUT SCIATICA: ICD-10-CM

## 2023-06-19 RX ORDER — TIZANIDINE 4 MG/1
TABLET ORAL
Qty: 90 TABLET | Refills: 1 | Status: SHIPPED | OUTPATIENT
Start: 2023-06-19

## 2023-08-11 DIAGNOSIS — M54.50 CHRONIC BILATERAL LOW BACK PAIN WITHOUT SCIATICA: ICD-10-CM

## 2023-08-11 DIAGNOSIS — G89.29 CHRONIC BILATERAL LOW BACK PAIN WITHOUT SCIATICA: ICD-10-CM

## 2023-08-11 RX ORDER — TIZANIDINE 4 MG/1
TABLET ORAL
Qty: 90 TABLET | Refills: 1 | Status: SHIPPED | OUTPATIENT
Start: 2023-08-11

## 2023-08-14 DIAGNOSIS — F51.01 PRIMARY INSOMNIA: ICD-10-CM

## 2023-08-14 RX ORDER — DARIDOREXANT 25 MG/1
TABLET, FILM COATED ORAL
Qty: 25 TABLET | Refills: 0 | Status: SHIPPED | OUTPATIENT
Start: 2023-08-14 | End: 2023-08-18 | Stop reason: SDUPTHER

## 2023-08-18 DIAGNOSIS — F51.01 PRIMARY INSOMNIA: ICD-10-CM

## 2023-08-18 RX ORDER — DARIDOREXANT 25 MG/1
25 TABLET, FILM COATED ORAL NIGHTLY
Qty: 25 TABLET | Refills: 0 | Status: SHIPPED | OUTPATIENT
Start: 2023-08-18

## 2023-08-22 DIAGNOSIS — G47.00 INSOMNIA, UNSPECIFIED TYPE: ICD-10-CM

## 2023-08-22 RX ORDER — TIZANIDINE HYDROCHLORIDE 4 MG/1
CAPSULE, GELATIN COATED ORAL
Qty: 30 CAPSULE | Refills: 0 | Status: SHIPPED | OUTPATIENT
Start: 2023-08-22

## 2023-09-07 DIAGNOSIS — M54.50 CHRONIC BILATERAL LOW BACK PAIN WITHOUT SCIATICA: ICD-10-CM

## 2023-09-07 DIAGNOSIS — G89.29 CHRONIC BILATERAL LOW BACK PAIN WITHOUT SCIATICA: ICD-10-CM

## 2023-09-07 RX ORDER — TIZANIDINE 4 MG/1
TABLET ORAL
Qty: 90 TABLET | Refills: 1 | Status: SHIPPED | OUTPATIENT
Start: 2023-09-07

## 2023-09-11 ENCOUNTER — TELEPHONE (OUTPATIENT)
Dept: FAMILY MEDICINE CLINIC | Facility: CLINIC | Age: 68
End: 2023-09-11

## 2023-09-11 NOTE — TELEPHONE ENCOUNTER
Caller: Emery Kincaid    Relationship: Self    Best call back number:    [phone]        What orders are you requesting (i.e. lab or imaging): LABS FOR AMW    In what timeframe would the patient need to come in: 09/12 OR 09/14/, 09/15/, OR 09/16/23    Where will you receive your lab/imaging services: DR. CHING    Additional notes: PATIENT IS CALLING TO REQUEST LABS PRIOR TO HIS AMW ON 09/18/2023.  HE STATES HE CAN NOT COME IN ON 09/13/2023.  HE WOULD LIKE A CALL WHEN HE CAN SCHEDULE.    PLEASE ADVISE.

## 2023-09-13 DIAGNOSIS — R73.9 HYPERGLYCEMIA: ICD-10-CM

## 2023-09-13 DIAGNOSIS — I10 BENIGN ESSENTIAL HYPERTENSION: ICD-10-CM

## 2023-09-13 DIAGNOSIS — E78.2 MIXED HYPERLIPIDEMIA: Primary | ICD-10-CM

## 2023-09-14 ENCOUNTER — LAB (OUTPATIENT)
Dept: FAMILY MEDICINE CLINIC | Facility: CLINIC | Age: 68
End: 2023-09-14
Payer: MEDICARE

## 2023-09-14 DIAGNOSIS — I10 BENIGN ESSENTIAL HYPERTENSION: ICD-10-CM

## 2023-09-14 DIAGNOSIS — E78.2 MIXED HYPERLIPIDEMIA: ICD-10-CM

## 2023-09-14 DIAGNOSIS — R73.9 HYPERGLYCEMIA: ICD-10-CM

## 2023-09-15 LAB
ALBUMIN SERPL-MCNC: 5 G/DL (ref 3.5–5.2)
ALBUMIN/GLOB SERPL: 2 G/DL
ALP SERPL-CCNC: 84 U/L (ref 39–117)
ALT SERPL-CCNC: 34 U/L (ref 1–41)
AST SERPL-CCNC: 22 U/L (ref 1–40)
BASOPHILS # BLD AUTO: 0.01 10*3/MM3 (ref 0–0.2)
BASOPHILS NFR BLD AUTO: 0.2 % (ref 0–1.5)
BILIRUB SERPL-MCNC: 0.7 MG/DL (ref 0–1.2)
BUN SERPL-MCNC: 21 MG/DL (ref 8–23)
BUN/CREAT SERPL: 21 (ref 7–25)
CALCIUM SERPL-MCNC: 10.1 MG/DL (ref 8.6–10.5)
CHLORIDE SERPL-SCNC: 100 MMOL/L (ref 98–107)
CHOLEST SERPL-MCNC: 154 MG/DL (ref 0–200)
CHOLEST/HDLC SERPL: 2.7 {RATIO}
CO2 SERPL-SCNC: 27.9 MMOL/L (ref 22–29)
CREAT SERPL-MCNC: 1 MG/DL (ref 0.76–1.27)
EGFRCR SERPLBLD CKD-EPI 2021: 81.5 ML/MIN/1.73
EOSINOPHIL # BLD AUTO: 0.13 10*3/MM3 (ref 0–0.4)
EOSINOPHIL NFR BLD AUTO: 2.4 % (ref 0.3–6.2)
ERYTHROCYTE [DISTWIDTH] IN BLOOD BY AUTOMATED COUNT: 13.1 % (ref 12.3–15.4)
GLOBULIN SER CALC-MCNC: 2.5 GM/DL
GLUCOSE SERPL-MCNC: 106 MG/DL (ref 65–99)
HBA1C MFR BLD: 5.1 % (ref 4.8–5.6)
HCT VFR BLD AUTO: 47.5 % (ref 37.5–51)
HDLC SERPL-MCNC: 57 MG/DL (ref 40–60)
HGB BLD-MCNC: 16.4 G/DL (ref 13–17.7)
IMM GRANULOCYTES # BLD AUTO: 0.05 10*3/MM3 (ref 0–0.05)
IMM GRANULOCYTES NFR BLD AUTO: 0.9 % (ref 0–0.5)
LDLC SERPL CALC-MCNC: 87 MG/DL (ref 0–100)
LYMPHOCYTES # BLD AUTO: 0.87 10*3/MM3 (ref 0.7–3.1)
LYMPHOCYTES NFR BLD AUTO: 16.1 % (ref 19.6–45.3)
MCH RBC QN AUTO: 31.4 PG (ref 26.6–33)
MCHC RBC AUTO-ENTMCNC: 34.5 G/DL (ref 31.5–35.7)
MCV RBC AUTO: 91 FL (ref 79–97)
MONOCYTES # BLD AUTO: 0.47 10*3/MM3 (ref 0.1–0.9)
MONOCYTES NFR BLD AUTO: 8.7 % (ref 5–12)
NEUTROPHILS # BLD AUTO: 3.88 10*3/MM3 (ref 1.7–7)
NEUTROPHILS NFR BLD AUTO: 71.7 % (ref 42.7–76)
NRBC BLD AUTO-RTO: 0 /100 WBC (ref 0–0.2)
PLATELET # BLD AUTO: 249 10*3/MM3 (ref 140–450)
POTASSIUM SERPL-SCNC: 4.7 MMOL/L (ref 3.5–5.2)
PROT SERPL-MCNC: 7.5 G/DL (ref 6–8.5)
RBC # BLD AUTO: 5.22 10*6/MM3 (ref 4.14–5.8)
SODIUM SERPL-SCNC: 139 MMOL/L (ref 136–145)
TRIGL SERPL-MCNC: 49 MG/DL (ref 0–150)
VLDLC SERPL CALC-MCNC: 10 MG/DL (ref 5–40)
WBC # BLD AUTO: 5.41 10*3/MM3 (ref 3.4–10.8)

## 2023-09-18 ENCOUNTER — OFFICE VISIT (OUTPATIENT)
Dept: FAMILY MEDICINE CLINIC | Facility: CLINIC | Age: 68
End: 2023-09-18
Payer: MEDICARE

## 2023-09-18 VITALS
WEIGHT: 170 LBS | OXYGEN SATURATION: 99 % | HEART RATE: 63 BPM | DIASTOLIC BLOOD PRESSURE: 70 MMHG | BODY MASS INDEX: 23.03 KG/M2 | SYSTOLIC BLOOD PRESSURE: 122 MMHG | RESPIRATION RATE: 18 BRPM | HEIGHT: 72 IN

## 2023-09-18 DIAGNOSIS — Z00.00 MEDICARE ANNUAL WELLNESS VISIT, SUBSEQUENT: Primary | ICD-10-CM

## 2023-09-18 DIAGNOSIS — R93.1 AGATSTON CORONARY ARTERY CALCIUM SCORE GREATER THAN 400: ICD-10-CM

## 2023-09-18 PROCEDURE — 1159F MED LIST DOCD IN RCRD: CPT | Performed by: FAMILY MEDICINE

## 2023-09-18 PROCEDURE — 3078F DIAST BP <80 MM HG: CPT | Performed by: FAMILY MEDICINE

## 2023-09-18 PROCEDURE — G0439 PPPS, SUBSEQ VISIT: HCPCS | Performed by: FAMILY MEDICINE

## 2023-09-18 PROCEDURE — 1160F RVW MEDS BY RX/DR IN RCRD: CPT | Performed by: FAMILY MEDICINE

## 2023-09-18 PROCEDURE — 3074F SYST BP LT 130 MM HG: CPT | Performed by: FAMILY MEDICINE

## 2023-09-18 PROCEDURE — 1170F FXNL STATUS ASSESSED: CPT | Performed by: FAMILY MEDICINE

## 2023-09-18 RX ORDER — LANOLIN ALCOHOL/MO/W.PET/CERES
500 CREAM (GRAM) TOPICAL NIGHTLY
Qty: 90 CAPSULE | Refills: 3 | Status: SHIPPED | OUTPATIENT
Start: 2023-09-18

## 2023-09-18 NOTE — PROGRESS NOTES
"Subsequent Medicare Wellness Visit     Emery Kincaid is a 67 y.o. male who presents for a Subsequent Medicare Wellness Visit.    Compared to one year ago, the patient feels his physical health is the same and his mental health is the same.  Recent Hospitalizations:  He was not admitted within the past 365 days.   Current Medical Providers:  Patient Care Team:  Marjorie Villa MD as PCP - General (Family Medicine)  No opioid medication identified on active medication list. I have reviewed chart for other potential  high risk medication/s and harmful drug interactions in the elderly.  Aspirin is on active medication list. Aspirin use is indicated based on review of current medical condition/s. Pros and cons of this therapy have been discussed today. Benefits of this medication outweigh potential harm.  Patient has been encouraged to continue taking this medication.  .    Advance Care Planning   Advance Directive is not on file.  ACP discussion was held with the patient during this visit. Patient does not have an advance directive, information provided.     Estimated body mass index is 23.06 kg/m² as calculated from the following:    Height as of this encounter: 182.9 cm (72\").    Weight as of this encounter: 77.1 kg (170 lb).  BMI is within normal parameters. No other follow-up for BMI required.  Does the patient have evidence of cognitive impairment? No  HEALTH RISK ASSESSMENT  Smoking Status:  Social History     Tobacco Use   Smoking Status Never   Smokeless Tobacco Never     Alcohol Consumption:  Social History     Substance and Sexual Activity   Alcohol Use Yes    Alcohol/week: 14.0 standard drinks    Types: 7 Glasses of wine, 7 Cans of beer per week    Comment: I usually have my drinks in the evening with food.     Fall Risk Screen:  STEADI Fall Risk Assessment was completed, and patient is at LOW risk for falls.Assessment completed on:2023  Depression Screenin/18/2023    11:04 AM   PHQ-2/PHQ-9 " Depression Screening   Little Interest or Pleasure in Doing Things 0-->not at all   Feeling Down, Depressed or Hopeless 0-->not at all   PHQ-9: Brief Depression Severity Measure Score 0     Health Habits and Functional and Cognitive Screenin/18/2023    11:08 AM   Functional & Cognitive Status   Do you have difficulty preparing food and eating? No   Do you have difficulty bathing yourself, getting dressed or grooming yourself? No   Do you have difficulty using the toilet? No   Do you have difficulty moving around from place to place? No   Do you have trouble with steps or getting out of a bed or a chair? No   Current Diet Well Balanced Diet   Dental Exam Up to date   Eye Exam Up to date   Exercise (times per week) 2 times per week   Current Exercises Include Weightlifting;Aerobics   Do you need help using the phone?  No   Are you deaf or do you have serious difficulty hearing?  No   Do you need help to go to places out of walking distance? No   Do you need help shopping? No   Do you need help preparing meals?  No   Do you need help with housework?  No   Do you need help with laundry? No   Do you need help taking your medications? No   Do you need help managing money? No   Do you ever drive or ride in a car without wearing a seat belt? No   Have you felt unusual stress, anger or loneliness in the last month? No   Who do you live with? Alone   If you need help, do you have trouble finding someone available to you? No   Have you been bothered in the last four weeks by sexual problems? No   Do you have difficulty concentrating, remembering or making decisions? No       Health Habits  Current Diet: Well Balanced Diet  Dental Exam: Up to date  Eye Exam: Up to date  Exercise (times per week): 2 times per week  Current Exercises Include: Weightlifting, Aerobics  Age-appropriate Screening Schedule:  Refer to the list below for future screening recommendations based on patient's age, sex and/or medical conditions.  Orders for these recommended tests are listed in the plan section. The patient has been provided with a written plan.  Health Maintenance   Topic Date Due    COVID-19 Vaccine (6 - Pfizer series) 01/22/2023    INFLUENZA VACCINE  10/01/2023    COLORECTAL CANCER SCREENING  03/27/2024    LIPID PANEL  09/14/2024    ANNUAL WELLNESS VISIT  09/18/2024    TDAP/TD VACCINES (3 - Td or Tdap) 12/03/2025    HEPATITIS C SCREENING  Completed    Pneumococcal Vaccine 65+  Completed    ZOSTER VACCINE  Completed        CMS Preventative Services Quick Reference  Risk Factors Identified During Encounter  None Identified  The above risks/problems have been discussed with the patient.  Follow up actions/plans if indicated are seen below in the Assessment/Plan Section.  Pertinent information has been shared with the patient in the After Visit Summary.  Patient Instructions        Follow Up: Medicare visit in one year  An After Visit Summary and PPPS were given/sent to the patient.    Patient has multiple medical problems which are significant and separately identifiable that require additional work above and beyond the Medicare Wellness Visit. These are not trivial or insignificant and are billed with a 25-modifier  Problem Visit:  Emery is a 67 y.o. also being seen today for hypertension, hyperlipemia, hyperglycemia.   Subjective   History of the Present Illness   No complaints.  Taking meds as ordered.  No headache, chest pain or shortness of breath. Tolerated the statins.   Social History  He  reports that he has never smoked. He has never used smokeless tobacco. He reports current alcohol use of about 14.0 standard drinks per week. He reports that he does not use drugs.  Objective   Vital Signs  BP Readings from Last 1 Encounters:   09/18/23 122/70     Wt Readings from Last 3 Encounters:   09/18/23 77.1 kg (170 lb)   03/15/23 72.6 kg (160 lb)   09/16/22 80.3 kg (177 lb)   Body mass index is 23.06 kg/m².     Physical Exam  Vitals  reviewed.   Constitutional:       Appearance: Normal appearance.   Cardiovascular:      Rate and Rhythm: Normal rate and regular rhythm.      Heart sounds: No murmur heard.  Pulmonary:      Effort: Pulmonary effort is normal.      Breath sounds: Normal breath sounds. No wheezing.   Neurological:      Mental Status: He is alert and oriented to person, place, and time.   Psychiatric:         Mood and Affect: Mood normal.         Behavior: Behavior normal.         Thought Content: Thought content normal.         Judgment: Judgment normal.           Assessment and Plan       Problem List Items Addressed This Visit          Cardiac and Vasculature    Agatston coronary artery calcium score greater than 400    Overview     Kallie 5/20/2022  Recommended moving to a higher dose and more potent statin. Also suggested, if he can tolerate it, a plant-based diet like Dr. Gomez.         Relevant Medications    niacin (NIACIN SR,NIASPAN ER) 500 MG CR capsule    Other Relevant Orders    Ambulatory Referral to Cardiology     Other Visit Diagnoses       Medicare annual wellness visit, subsequent    -  Primary                 Patient was given instructions and counseling regarding his condition or for health maintenance advice. Please see specific information pulled into the AVS if appropriate.

## 2023-09-18 NOTE — PATIENT INSTRUCTIONS
Advance Care Planning and Advance Directives     You make decisions on a daily basis - decisions about where you want to live, your career, your home, your life. Perhaps one of the most important decisions you face is your choice for future medical care. Take time to talk with your family and your healthcare team and start planning today.  Advance Care Planning is a process that can help you:  Understand possible future healthcare decisions in light of your own experiences  Reflect on those decision in light of your goals and values  Discuss your decisions with those closest to you and the healthcare professionals that care for you  Make a plan by creating a document that reflects your wishes    Surrogate Decision Maker  In the event of a medical emergency, which has left you unable to communicate or to make your own decisions, you would need someone to make decisions for you.  It is important to discuss your preferences for medical treatment with this person while you are in good health.     Qualities of a surrogate decision maker:  Willing to take on this role and responsibility  Knows what you want for future medical care  Willing to follow your wishes even if they don't agree with them  Able to make difficult medical decisions under stressful circumstances    Advance Directives  These are legal documents you can create that will guide your healthcare team and decision maker(s) when needed. These documents can be stored in the electronic medical record.    Living Will - a legal document to guide your care if you have a terminal condition or a serious illness and are unable to communicate. States vary by statute in document names/types, but most forms may include one or more of the following:        -  Directions regarding life-prolonging treatments        -  Directions regarding artificially provided nutrition/hydration        -  Choosing a healthcare decision maker        -  Direction regarding organ/tissue  donation    Durable Power of  for Healthcare - this document names an -in-fact to make medical decisions for you, but it may also allow this person to make personal and financial decisions for you. Please seek the advice of an  if you need this type of document.    **Advance Directives are not required and no one may discriminate against you if you do not sign one.    Medical Orders  Many states allow specific forms/orders signed by your physician to record your wishes for medical treatment in your current state of health. This form, signed in personal communication with your physician, addresses resuscitation and other medical interventions that you may or may not want.      For more information or to schedule a time with a [facility] Advance Care Planning Facilitator contact: [email or web address] or call [phone] and someone will contact you directly.

## 2023-09-21 DIAGNOSIS — G47.00 INSOMNIA, UNSPECIFIED TYPE: ICD-10-CM

## 2023-09-21 RX ORDER — TIZANIDINE HYDROCHLORIDE 4 MG/1
CAPSULE, GELATIN COATED ORAL
Qty: 30 CAPSULE | Refills: 0 | Status: SHIPPED | OUTPATIENT
Start: 2023-09-21

## 2023-10-30 DIAGNOSIS — G47.00 INSOMNIA, UNSPECIFIED TYPE: ICD-10-CM

## 2023-10-30 RX ORDER — TIZANIDINE HYDROCHLORIDE 4 MG/1
CAPSULE, GELATIN COATED ORAL
Qty: 30 CAPSULE | Refills: 0 | Status: SHIPPED | OUTPATIENT
Start: 2023-10-30

## 2023-11-10 DIAGNOSIS — E78.2 MIXED HYPERLIPIDEMIA: ICD-10-CM

## 2023-11-10 DIAGNOSIS — I10 BENIGN ESSENTIAL HYPERTENSION: ICD-10-CM

## 2023-11-10 RX ORDER — INDAPAMIDE 1.25 MG/1
1.25 TABLET ORAL DAILY
Qty: 90 TABLET | Refills: 1 | Status: SHIPPED | OUTPATIENT
Start: 2023-11-10

## 2023-11-10 RX ORDER — ROSUVASTATIN CALCIUM 40 MG/1
40 TABLET, COATED ORAL DAILY
Qty: 90 TABLET | Refills: 1 | Status: SHIPPED | OUTPATIENT
Start: 2023-11-10

## 2023-11-12 DIAGNOSIS — G89.29 CHRONIC BILATERAL LOW BACK PAIN WITHOUT SCIATICA: ICD-10-CM

## 2023-11-12 DIAGNOSIS — M54.50 CHRONIC BILATERAL LOW BACK PAIN WITHOUT SCIATICA: ICD-10-CM

## 2023-11-13 RX ORDER — TIZANIDINE 4 MG/1
TABLET ORAL
Qty: 90 TABLET | Refills: 1 | Status: SHIPPED | OUTPATIENT
Start: 2023-11-13

## 2023-12-05 DIAGNOSIS — G47.00 INSOMNIA, UNSPECIFIED TYPE: ICD-10-CM

## 2023-12-05 RX ORDER — TIZANIDINE HYDROCHLORIDE 4 MG/1
CAPSULE, GELATIN COATED ORAL
Qty: 30 CAPSULE | Refills: 0 | Status: SHIPPED | OUTPATIENT
Start: 2023-12-05

## 2023-12-08 ENCOUNTER — OFFICE VISIT (OUTPATIENT)
Dept: FAMILY MEDICINE CLINIC | Facility: CLINIC | Age: 68
End: 2023-12-08
Payer: MEDICARE

## 2023-12-08 VITALS
DIASTOLIC BLOOD PRESSURE: 70 MMHG | HEIGHT: 72 IN | RESPIRATION RATE: 18 BRPM | OXYGEN SATURATION: 99 % | HEART RATE: 63 BPM | SYSTOLIC BLOOD PRESSURE: 132 MMHG | BODY MASS INDEX: 23.3 KG/M2 | WEIGHT: 172 LBS

## 2023-12-08 DIAGNOSIS — D16.7: Primary | ICD-10-CM

## 2023-12-08 PROCEDURE — 3075F SYST BP GE 130 - 139MM HG: CPT | Performed by: FAMILY MEDICINE

## 2023-12-08 PROCEDURE — 3078F DIAST BP <80 MM HG: CPT | Performed by: FAMILY MEDICINE

## 2023-12-08 PROCEDURE — 99212 OFFICE O/P EST SF 10 MIN: CPT | Performed by: FAMILY MEDICINE

## 2023-12-08 NOTE — PROGRESS NOTES
Assessment and Plan     Assessment & Plan  Benign neoplasm of costal cartilage  Not really a growth but more of a puffiness that is a little different than the other side.  No mass appreciated no tenderness.  No redness.  No follow-ups on file.          Emery is a 68 y.o. being seen today for  No chief complaint on file.   HISTORY    HPI  Answers submitted by the patient for this visit:  Other (Submitted on 12/8/2023)  Please describe your symptoms.: Possible swelling on left side of abdomen near ribs.  Have you had these symptoms before?: No  How long have you been having these symptoms?: Greater than 2 weeks    Social History  He  reports that he has never smoked. He has never used smokeless tobacco. He reports current alcohol use of about 14.0 standard drinks of alcohol per week. He reports that he does not use drugs.  EXAM DATA    Vital Signs        BP Readings from Last 1 Encounters:   09/18/23 122/70     Wt Readings from Last 3 Encounters:   09/18/23 77.1 kg (170 lb)   03/15/23 72.6 kg (160 lb)   09/16/22 80.3 kg (177 lb)   There is no height or weight on file to calculate BMI.   Physical Exam  Vitals reviewed.   Constitutional:       Appearance: Normal appearance. He is well-developed.   Musculoskeletal:      Comments: There is a vague 2 x 1 cm puffiness along the left lower costochondral border nontender.   Neurological:      Mental Status: He is alert.   Psychiatric:         Behavior: Behavior normal.         Thought Content: Thought content normal.         Judgment: Judgment normal.       BMI is within normal parameters. No other follow-up for BMI required.               Patient was given instructions and counseling regarding his condition or for health maintenance advice. Please see specific information pulled into the AVS if appropriate.

## 2024-01-03 DIAGNOSIS — G47.00 INSOMNIA, UNSPECIFIED TYPE: ICD-10-CM

## 2024-01-03 RX ORDER — TIZANIDINE HYDROCHLORIDE 4 MG/1
CAPSULE, GELATIN COATED ORAL
Qty: 30 CAPSULE | Refills: 3 | Status: SHIPPED | OUTPATIENT
Start: 2024-01-03

## 2024-01-05 DIAGNOSIS — G89.29 CHRONIC BILATERAL LOW BACK PAIN WITHOUT SCIATICA: ICD-10-CM

## 2024-01-05 DIAGNOSIS — M54.50 CHRONIC BILATERAL LOW BACK PAIN WITHOUT SCIATICA: ICD-10-CM

## 2024-01-05 RX ORDER — TIZANIDINE 4 MG/1
TABLET ORAL
Qty: 90 TABLET | Refills: 1 | Status: SHIPPED | OUTPATIENT
Start: 2024-01-05

## 2024-01-11 DIAGNOSIS — I10 BENIGN ESSENTIAL HYPERTENSION: ICD-10-CM

## 2024-01-11 RX ORDER — LOSARTAN POTASSIUM 25 MG/1
25 TABLET ORAL DAILY
Qty: 90 TABLET | Refills: 1 | Status: SHIPPED | OUTPATIENT
Start: 2024-01-11

## 2024-03-18 ENCOUNTER — OFFICE VISIT (OUTPATIENT)
Dept: FAMILY MEDICINE CLINIC | Facility: CLINIC | Age: 69
End: 2024-03-18
Payer: MEDICARE

## 2024-03-18 VITALS
OXYGEN SATURATION: 100 % | BODY MASS INDEX: 23.43 KG/M2 | SYSTOLIC BLOOD PRESSURE: 138 MMHG | HEART RATE: 64 BPM | DIASTOLIC BLOOD PRESSURE: 80 MMHG | WEIGHT: 173 LBS | HEIGHT: 72 IN | RESPIRATION RATE: 18 BRPM

## 2024-03-18 DIAGNOSIS — R73.9 HYPERGLYCEMIA: ICD-10-CM

## 2024-03-18 DIAGNOSIS — I10 BENIGN ESSENTIAL HYPERTENSION: Primary | ICD-10-CM

## 2024-03-18 DIAGNOSIS — Z12.11 ENCOUNTER FOR SCREENING FOR MALIGNANT NEOPLASM OF COLON: ICD-10-CM

## 2024-03-18 DIAGNOSIS — E78.2 MIXED HYPERLIPIDEMIA: ICD-10-CM

## 2024-03-18 NOTE — PROGRESS NOTES
Assessment & Plan  HTN - well controlled    2. Sleep issues.  He will continue tizanidine.    3. Hyperlipidemia  Labs are good.     4. Hyperglycemia Kallie 3/18/2024    Most recent A1c level(s):    Lab Results   Component Value Date    HGBA1C 5.20 03/12/2024    HGBA1C 5.10 09/14/2023    HGBA1C 4.90 03/13/2023        Follow-up  He will follow up with [facility] in six months.     Orders Placed This Encounter   Procedures    Cologuard - Stool, Per Rectum      Patient was given instructions and counseling regarding his condition or for health maintenance advice. Please see specific information pulled into the AVS if appropriate.          Emery is a 68 y.o. being seen today for  Hypertension   HISTORY    Hypertension  This is a chronic problem. The current episode started more than 1 year ago. The problem has been stable since onset. Pertinent negatives include no anxiety, blurred vision, chest pain, headaches, malaise/fatigue, orthopnea, palpitations, peripheral edema or shortness of breath. There are no associated agents to hypertension. There are no compliance problems.      History of Present Illness  The patient is a 60-year-old male who presents for evaluation of multiple medical concerns.    He has been trying to lose weight and feels like he is 5 pounds over. He has joined a gym and has been weightlifting a little more. He is hovering about 10 pounds higher than he did during his wellness. He got down as low as 157 with minimal clothing on and now he is 168 with minimal clothing on it. He does not feel super heavy around the middle. His HDL was a little low compared to what it used to be. He decided to stop having the alcohol for a month because that might help his weight, but it did not. It did not help his sleep anymore, but his sleep is under control with the tizanidine. He has been taking Ralaxim Sleep, which has different typical supplements like triptophane, JEAN PAUL, melatonin, and amino acid, which seems to  help. He took them purposefully before his blood tests, and they do not seem to be affecting his liver.    He always does the Cologuard. Dr. Martinez sent him the FIT test. He wonders if it would be a problem if he laid off a year and then would skip the FIT test that year.    He went to the urologist, who was concerned about how big his hydrocele was. It has gone up. They were concerned about a hernia, but the CT scan showed no hernia. He did not have any pain. He was prescribed Flomax for his prostate. He took it for 2.5 weeks. 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 He is nearsighted. He stopped taking Flomax. He has not seen the urologist. He canceled his follow-up at the end of 02/2024. He inquired about finasteride. He read about floppy iris syndrome, and it did not help for the people to lay off of the medication. He might go to Dr. Riley.    He inquired why his lymphocytes are always on the low end.    He still has some Penlac left.   His twin brother has a hernia. His father had 3 hernias. His mother had cataracts.  Social History  He  reports that he has never smoked. He has never used smokeless tobacco. He reports current alcohol use of about 14.0 standard drinks of alcohol per week. He reports that he does not use drugs.  EXAM DATA    Vital Signs        BP Readings from Last 1 Encounters:   03/18/24 138/80     Wt Readings from Last 3 Encounters:   03/18/24 78.5 kg (173 lb)   12/08/23 78 kg (172 lb)   09/18/23 77.1 kg (170 lb)   Body mass index is 23.46 kg/m².  Physical Exam  Vitals reviewed.   Constitutional:       Appearance: Normal appearance.   Cardiovascular:      Rate and Rhythm: Normal rate and regular rhythm.      Heart sounds: No murmur heard.  Pulmonary:      Effort: Pulmonary effort is normal.      Breath sounds: Normal breath sounds. No wheezing.   Neurological:      Mental Status: He is alert and oriented to person, place,  and time.   Psychiatric:         Mood and Affect: Mood normal.         Behavior: Behavior normal.         Thought Content: Thought content normal.         Judgment: Judgment normal.        Physical Exam         Patient or patient representative verbalized consent for the use of Ambient Listening during the visit with  Marjorie Villa MD for chart documentation. 3/18/2024  10:35 EDT

## 2024-04-25 DIAGNOSIS — R19.5 POSITIVE COLORECTAL CANCER SCREENING USING COLOGUARD TEST: Primary | ICD-10-CM

## 2024-05-02 ENCOUNTER — OFFICE VISIT (OUTPATIENT)
Dept: SURGERY | Facility: CLINIC | Age: 69
End: 2024-05-02
Payer: MEDICARE

## 2024-05-02 VITALS
OXYGEN SATURATION: 99 % | WEIGHT: 177.5 LBS | DIASTOLIC BLOOD PRESSURE: 80 MMHG | HEART RATE: 72 BPM | BODY MASS INDEX: 24.04 KG/M2 | SYSTOLIC BLOOD PRESSURE: 110 MMHG | HEIGHT: 72 IN

## 2024-05-02 DIAGNOSIS — R19.5 POSITIVE COLORECTAL CANCER SCREENING USING COLOGUARD TEST: Primary | ICD-10-CM

## 2024-05-02 PROCEDURE — 3079F DIAST BP 80-89 MM HG: CPT | Performed by: PHYSICIAN ASSISTANT

## 2024-05-02 PROCEDURE — 99204 OFFICE O/P NEW MOD 45 MIN: CPT | Performed by: PHYSICIAN ASSISTANT

## 2024-05-02 PROCEDURE — 1159F MED LIST DOCD IN RCRD: CPT | Performed by: PHYSICIAN ASSISTANT

## 2024-05-02 PROCEDURE — 1160F RVW MEDS BY RX/DR IN RCRD: CPT | Performed by: PHYSICIAN ASSISTANT

## 2024-05-02 PROCEDURE — 3074F SYST BP LT 130 MM HG: CPT | Performed by: PHYSICIAN ASSISTANT

## 2024-05-02 RX ORDER — SODIUM CHLORIDE, SODIUM LACTATE, POTASSIUM CHLORIDE, CALCIUM CHLORIDE 600; 310; 30; 20 MG/100ML; MG/100ML; MG/100ML; MG/100ML
30 INJECTION, SOLUTION INTRAVENOUS CONTINUOUS
OUTPATIENT
Start: 2024-05-02

## 2024-05-02 NOTE — PROGRESS NOTES
Emery Kincaid is a 68 y.o. male who is seen as a consult at the request of Marjorie Villa MD for Colonoscopy.      HPI:  Pt presents today for evaluation after receiving a positive Cologuard result in 04/2024.  Tested previously with Cologuard in 03/2021, which was negative.   Pt also reports a negative FIT in 12/2023.   Underwent 1 colonoscopy in the past, which was performed in 2006. This was negative per Pt.   Parents lived to be 90 and 101, but neither ever had a colonoscopy performed.   Has an identical twin brother.   No known FHx of colon polyps, colon cancer, or IBD.     Rare episodes of BRBPR, which he contributes to inflamed hemorrhoids.  He denies any melena, abdominal pain, nausea, or vomiting.   No changes in weight, appetite, bowel function, stool caliber.   He denies any other concerns at this time.       Past Medical History:   Diagnosis Date    Acid reflux     Benign prostatic hyperplasia Several years ago    Carpal tunnel syndrome     Coronary artery disease May 2022    Coronary calcium score of 432    Cough     Disc disorder     lumbar buldging disc    History of medical problems 2010    Hydrocele left side.  Large but stable since 2017; dental implant 2-28-23.    Hydrocele     Hyperlipidemia In the early 1990’s    It is being treated and under control.    Hypertension     Low back pain 2014    Visual impairment 1961    Severe myopia resulting in retinal tear in 2013 (repaired).       Past Surgical History:   Procedure Laterality Date    COLONOSCOPY      EYE SURGERY  January 2013    Laser repair right eye retinal tear caused by PVD.    RETINAL DETACHMENT REPAIR         Social History:   reports that he has never smoked. He has never used smokeless tobacco. He reports current alcohol use of about 14.0 standard drinks of alcohol per week. He reports that he does not use drugs.      Marriage status: Single    Family History   Problem Relation Age of Onset    Cancer Mother         Skin cancer  successfully treated several times starting around age 75.    COPD Mother         Mild symptoms in her 90’s possibly due to second-hand smoke.    Miscarriages / Stillbirths Mother         Never learned any details.    Vision loss Mother         AMD that never affected vision even at age 101    Other Mother         Ulcers and gall bladder stones treated with surgery around age 50    Hypertension Father         Since 25 years old.  Due to drug technology, probably wasn’t under the best control in early years.    Arthritis Father         My father had mild symptoms.    Heart disease Father         Angina at age 65 and quadruple bypass at 69.  Lived to 90.    Hyperlipidemia Father     Vision loss Father         Glaucoma in later years controlled with eye drops    Diabetes Sister         Type 2 started in her 40’s (born 1944).    Hypertension Brother         Roughly the same history, treatment, and results as me.    Hyperlipidemia Brother     Other Brother         Gastritis controlled with protein pump inhibitors; also small gall stones causing elevated bilirubin    Liver disease Paternal Grandmother          of cirrhosis at age of late 60’s; never drank but heavily exposed to dry cleaning solvents    Depression Paternal Uncle         “Nervous breakdown” in [employer], then depression after detention controlled by electric shock treatment         Current Outpatient Medications:     aspirin 81 MG chewable tablet, Chew., Disp: , Rfl:     ciclopirox (Penlac) 8 % solution, Apply  topically to the appropriate area as directed Every Night., Disp: 6 mL, Rfl: 5    Coenzyme Q10 (COQ-10) 100 MG capsule, , Disp: , Rfl:     CRANBERRY EXTRACT PO, Take  by mouth., Disp: , Rfl:     Daridorexant HCl (Quviviq) 25 MG tablet, Take 25 mg by mouth Every Night., Disp: 25 tablet, Rfl: 0    Garlic 500 MG capsule, Take by mouth., Disp: , Rfl:     Glucosamine-Chondroit-Vit C-Mn (GLUCOSAMINE CHONDR 1500 COMPLX PO), Take by mouth., Disp: ,  Rfl:     indapamide (LOZOL) 1.25 MG tablet, TAKE 1 TABLET BY MOUTH EVERY DAY, Disp: 90 tablet, Rfl: 1    Lecithin 1200 MG capsule, , Disp: , Rfl:     losartan (COZAAR) 25 MG tablet, TAKE 1 TABLET BY MOUTH EVERY DAY, Disp: 90 tablet, Rfl: 1    Multiple Vitamin (MULTIVITAMINS PO), Take by mouth., Disp: , Rfl:     niacin (NIACIN SR,NIASPAN ER) 500 MG CR capsule, Take 1 capsule by mouth Every Night., Disp: 90 capsule, Rfl: 3    Omega-3 Fatty Acids (FISH OIL) 1200 MG capsule, Take by mouth., Disp: , Rfl:     Pyridoxine HCl (B-6 PO), Take  by mouth., Disp: , Rfl:     rosuvastatin (CRESTOR) 40 MG tablet, TAKE 1 TABLET BY MOUTH EVERY DAY, Disp: 90 tablet, Rfl: 1    Saw Palmetto 500 MG capsule, Take by mouth., Disp: , Rfl:     SM CINNAMON PO, , Disp: , Rfl:     tiZANidine (ZANAFLEX) 4 MG tablet, TAKE 1 TABLET BY MOUTH THREE TIMES A DAY, Disp: 90 tablet, Rfl: 1    Zinc Sulfate (ZINC 15 PO), Take  by mouth., Disp: , Rfl:     Allergy  Patient has no known allergies.      Vitals:    05/02/24 1056   BP: 110/80   Pulse: 72   SpO2: 99%     Body mass index is 24.07 kg/m².        Physical Exam  Exam conducted with a chaperone present.   Constitutional:       General: He is not in acute distress.     Appearance: He is well-developed.   HENT:      Head: Normocephalic and atraumatic.      Nose: Nose normal.   Eyes:      Conjunctiva/sclera: Conjunctivae normal.      Pupils: Pupils are equal, round, and reactive to light.   Neck:      Trachea: No tracheal deviation.   Pulmonary:      Effort: Pulmonary effort is normal. No respiratory distress.      Breath sounds: Normal breath sounds.   Abdominal:      General: Bowel sounds are normal. There is no distension.      Palpations: Abdomen is soft.   Musculoskeletal:         General: No deformity. Normal range of motion.      Cervical back: Normal range of motion.   Skin:     General: Skin is warm and dry.   Neurological:      Mental Status: He is alert and oriented to person, place, and time.       Cranial Nerves: No cranial nerve deficit.      Coordination: Coordination normal.      Gait: Gait normal.   Psychiatric:         Behavior: Behavior normal.         Judgment: Judgment normal.         Review of Medical Record:  I reviewed medical records as detailed in HPI.     Assessment:  1. Positive colorectal cancer screening using Cologuard test    - New    Plan:  - Pt received a positive Cologuard result 04/2024. Recommended a colonoscopy to assess for colonic neoplasms. Discussed procedure as well as risks and benefits. Pt expresses understanding and wishes to schedule today.

## 2024-05-15 ENCOUNTER — HOSPITAL ENCOUNTER (OUTPATIENT)
Facility: HOSPITAL | Age: 69
Setting detail: HOSPITAL OUTPATIENT SURGERY
Discharge: HOME | End: 2024-05-15
Attending: COLON & RECTAL SURGERY | Admitting: COLON & RECTAL SURGERY
Payer: MEDICARE

## 2024-05-15 ENCOUNTER — ANESTHESIA EVENT (OUTPATIENT)
Dept: GASTROENTEROLOGY | Facility: HOSPITAL | Age: 69
End: 2024-05-15
Payer: MEDICARE

## 2024-05-15 ENCOUNTER — ANESTHESIA (OUTPATIENT)
Dept: GASTROENTEROLOGY | Facility: HOSPITAL | Age: 69
End: 2024-05-15
Payer: MEDICARE

## 2024-05-15 VITALS
RESPIRATION RATE: 16 BRPM | SYSTOLIC BLOOD PRESSURE: 145 MMHG | DIASTOLIC BLOOD PRESSURE: 83 MMHG | HEART RATE: 78 BPM | OXYGEN SATURATION: 97 % | WEIGHT: 169.7 LBS | HEIGHT: 72 IN | BODY MASS INDEX: 22.98 KG/M2

## 2024-05-15 DIAGNOSIS — R19.5 POSITIVE COLORECTAL CANCER SCREENING USING COLOGUARD TEST: ICD-10-CM

## 2024-05-15 PROCEDURE — 25810000003 LACTATED RINGERS PER 1000 ML: Performed by: COLON & RECTAL SURGERY

## 2024-05-15 PROCEDURE — 25010000002 PROPOFOL 1000 MG/100ML EMULSION

## 2024-05-15 PROCEDURE — 45385 COLONOSCOPY W/LESION REMOVAL: CPT | Performed by: COLON & RECTAL SURGERY

## 2024-05-15 PROCEDURE — 88305 TISSUE EXAM BY PATHOLOGIST: CPT | Performed by: COLON & RECTAL SURGERY

## 2024-05-15 RX ORDER — PROPOFOL 10 MG/ML
INJECTION, EMULSION INTRAVENOUS CONTINUOUS PRN
Status: DISCONTINUED | OUTPATIENT
Start: 2024-05-15 | End: 2024-05-15 | Stop reason: SURG

## 2024-05-15 RX ORDER — SODIUM CHLORIDE 0.9 % (FLUSH) 0.9 %
10 SYRINGE (ML) INJECTION AS NEEDED
Status: DISCONTINUED | OUTPATIENT
Start: 2024-05-15 | End: 2024-05-15 | Stop reason: HOSPADM

## 2024-05-15 RX ORDER — LIDOCAINE HYDROCHLORIDE 20 MG/ML
INJECTION, SOLUTION INFILTRATION; PERINEURAL AS NEEDED
Status: DISCONTINUED | OUTPATIENT
Start: 2024-05-15 | End: 2024-05-15 | Stop reason: SURG

## 2024-05-15 RX ORDER — SODIUM CHLORIDE, SODIUM LACTATE, POTASSIUM CHLORIDE, CALCIUM CHLORIDE 600; 310; 30; 20 MG/100ML; MG/100ML; MG/100ML; MG/100ML
1000 INJECTION, SOLUTION INTRAVENOUS CONTINUOUS
Status: DISCONTINUED | OUTPATIENT
Start: 2024-05-15 | End: 2024-05-15 | Stop reason: HOSPADM

## 2024-05-15 RX ORDER — SODIUM CHLORIDE, SODIUM LACTATE, POTASSIUM CHLORIDE, CALCIUM CHLORIDE 600; 310; 30; 20 MG/100ML; MG/100ML; MG/100ML; MG/100ML
30 INJECTION, SOLUTION INTRAVENOUS CONTINUOUS
Status: DISCONTINUED | OUTPATIENT
Start: 2024-05-15 | End: 2024-05-15 | Stop reason: HOSPADM

## 2024-05-15 RX ADMIN — PROPOFOL INJECTABLE EMULSION 100 MG: 10 INJECTION, EMULSION INTRAVENOUS at 08:15

## 2024-05-15 RX ADMIN — LIDOCAINE HYDROCHLORIDE 60 MG: 20 INJECTION, SOLUTION INFILTRATION; PERINEURAL at 08:15

## 2024-05-15 RX ADMIN — SODIUM CHLORIDE, POTASSIUM CHLORIDE, SODIUM LACTATE AND CALCIUM CHLORIDE 1000 ML: 600; 310; 30; 20 INJECTION, SOLUTION INTRAVENOUS at 07:50

## 2024-05-15 RX ADMIN — PROPOFOL INJECTABLE EMULSION 160 MCG/KG/MIN: 10 INJECTION, EMULSION INTRAVENOUS at 08:16

## 2024-05-15 NOTE — DISCHARGE INSTRUCTIONS

## 2024-05-15 NOTE — ANESTHESIA POSTPROCEDURE EVALUATION
Patient: Emery Kincaid    Procedure Summary       Date: 05/15/24 Room / Location: [facility] END[facility]COPY 10 / [facility]    Anesthesia Start: 0813 Anesthesia Stop: 0839    Procedure: COLONOSCOPY to cecum with hot snare polypectomies Diagnosis:       Positive colorectal cancer screening using Cologuard test      (Positive colorectal cancer screening using Cologuard test [R19.5])    Surgeons: Eran Jerez MD Provider: Surya Hill MD    Anesthesia Type: MAC ASA Status: 3            Anesthesia Type: MAC    Vitals  Vitals Value Taken Time   /94 05/15/24 0848   Temp     Pulse 75 05/15/24 0854   Resp 18 05/15/24 0838   SpO2 96 % 05/15/24 0854   Vitals shown include unfiled device data.        Post Anesthesia Care and Evaluation    Patient location during evaluation: bedside  Patient participation: complete - patient participated  Level of consciousness: awake  Pain management: adequate    Airway patency: patent  Anesthetic complications: No anesthetic complications  PONV Status: controlled  Cardiovascular status: acceptable  Respiratory status: acceptable  Hydration status: acceptable    Comments: --------------------            05/15/24               0838     --------------------   BP:       110/76     Pulse:      75       Resp:       18       SpO2:      91%      --------------------

## 2024-05-15 NOTE — H&P
Emery Kincaid is a 68 y.o. male  who is referred by Eran Jerez MD for a colonoscopy. He   has an indications: +cologuard.     He denies any change in bowel function, melena, or hematochezia.    Past Medical History:   Diagnosis Date    Acid reflux     Benign prostatic hyperplasia Several years ago    Carpal tunnel syndrome     Coronary artery disease May 2022    Coronary calcium score of 432    Cough     Disc disorder     lumbar buldging disc    History of medical problems 2010    Hydrocele left side.  Large but stable since 2017; dental implant 2-28-23.    Hydrocele     Hyperlipidemia In the early 1990’s    It is being treated and under control.    Hypertension     Low back pain 2014    Visual impairment 1961    Severe myopia resulting in retinal tear in 2013 (repaired).       Past Surgical History:   Procedure Laterality Date    COLONOSCOPY      EYE SURGERY  January 2013    Laser repair right eye retinal tear caused by PVD.    RETINAL DETACHMENT REPAIR         Medications Prior to Admission   Medication Sig Dispense Refill Last Dose    ciclopirox (Penlac) 8 % solution Apply  topically to the appropriate area as directed Every Night. 6 mL 5 Past Week    Coenzyme Q10 (COQ-10) 100 MG capsule    Past Week    CRANBERRY EXTRACT PO Take  by mouth.   Past Week    Daridorexant HCl (Quviviq) 25 MG tablet Take 25 mg by mouth Every Night. 25 tablet 0 Past Week    Garlic 500 MG capsule Take by mouth.   Past Week    Glucosamine-Chondroit-Vit C-Mn (GLUCOSAMINE CHONDR 1500 COMPLX PO) Take by mouth.   Past Week    indapamide (LOZOL) 1.25 MG tablet TAKE 1 TABLET BY MOUTH EVERY DAY 90 tablet 1 Past Week    Lecithin 1200 MG capsule    Past Week    losartan (COZAAR) 25 MG tablet TAKE 1 TABLET BY MOUTH EVERY DAY 90 tablet 1 5/14/2024    Multiple Vitamin (MULTIVITAMINS PO) Take by mouth.   Past Week    niacin (NIACIN SR,NIASPAN ER) 500 MG CR capsule Take 1 capsule by mouth Every Night. 90 capsule 3 Past Week    Omega-3 Fatty  Acids (FISH OIL) 1200 MG capsule Take by mouth.   Past Week    Pyridoxine HCl (B-6 PO) Take  by mouth.   Past Week    rosuvastatin (CRESTOR) 40 MG tablet TAKE 1 TABLET BY MOUTH EVERY DAY 90 tablet 1 2024    Saw Palmetto 500 MG capsule Take by mouth.   Past Week    SM CINNAMON PO    Past Week    tiZANidine (ZANAFLEX) 4 MG tablet TAKE 1 TABLET BY MOUTH THREE TIMES A DAY 90 tablet 1 2024    Zinc Sulfate (ZINC 15 PO) Take  by mouth.   Past Week    aspirin 81 MG chewable tablet Chew.   2024       No Known Allergies    Family History   Problem Relation Age of Onset    Cancer Mother         Skin cancer successfully treated several times starting around age 75.    COPD Mother         Mild symptoms in her 90’s possibly due to second-hand smoke.    Miscarriages / Stillbirths Mother         Never learned any details.    Vision loss Mother         AMD that never affected vision even at age 101    Other Mother         Ulcers and gall bladder stones treated with surgery around age 50    Hypertension Father         Since 25 years old.  Due to drug technology, probably wasn’t under the best control in early years.    Arthritis Father         My father had mild symptoms.    Heart disease Father         Angina at age 65 and quadruple bypass at 69.  Lived to 90.    Hyperlipidemia Father     Vision loss Father         Glaucoma in later years controlled with eye drops    Diabetes Sister         Type 2 started in her 40’s (born 1944).    Hypertension Brother         Roughly the same history, treatment, and results as me.    Hyperlipidemia Brother     Other Brother         Gastritis controlled with protein pump inhibitors; also small gall stones causing elevated bilirubin    Liver disease Paternal Grandmother          of cirrhosis at age of late 60’s; never drank but heavily exposed to dry cleaning solvents    Depression Paternal Uncle         “Nervous breakdown” in highschool, then depression after half-way  controlled by electric shock treatment       Social History     Socioeconomic History    Marital status: Single   Tobacco Use    Smoking status: Never    Smokeless tobacco: Never   Vaping Use    Vaping status: Never Used   Substance and Sexual Activity    Alcohol use: Yes     Alcohol/week: 12.0 standard drinks of alcohol     Types: 7 Glasses of wine, 5 Cans of beer per week     Comment: I usually have my drinks in the evening with food.    Drug use: No    Sexual activity: Not Currently     Partners: Female     Birth control/protection: None       Review of Systems   Gastrointestinal:  Negative for abdominal pain, nausea and vomiting.   All other systems reviewed and are negative.      Vitals:    05/15/24 0726   BP: 163/96   Pulse: 82   Resp: 16   SpO2: 98%         Physical Exam  Constitutional:       General: He is not in acute distress.     Appearance: He is well-developed.   HENT:      Head: Normocephalic and atraumatic.   Abdominal:      General: There is no distension.      Palpations: Abdomen is soft.   Musculoskeletal:         General: Normal range of motion.   Neurological:      Mental Status: He is alert.   Psychiatric:         Thought Content: Thought content normal.           Assessment & Plan      indications: +cologuard         I recommend colonoscopy.  I described risks, benefits of the procedure with the patient including but not limited to bleeding, infection, possibility of perforation and possible polypectomy. All of the patient's questions were answered and they would like to proceed with the above recommendations.

## 2024-05-15 NOTE — ANESTHESIA PREPROCEDURE EVALUATION
Anesthesia Evaluation     Patient summary reviewed and Nursing notes reviewed   NPO Solid Status: > 8 hours             Airway   Mallampati: II  TM distance: >3 FB  Neck ROM: full  no difficulty expected  Dental - normal exam     Pulmonary - negative pulmonary ROS and normal exam   Cardiovascular - normal exam    (+) hypertension, CAD      Neuro/Psych- negative ROS  GI/Hepatic/Renal/Endo - negative ROS     Musculoskeletal     (+) back pain  Abdominal    Substance History - negative use     OB/GYN negative ob/gyn ROS         Other                    Anesthesia Plan    ASA 3     MAC       Anesthetic plan, risks, benefits, and alternatives have been provided, discussed and informed consent has been obtained with: patient.    CODE STATUS:

## 2024-05-16 DIAGNOSIS — E78.2 MIXED HYPERLIPIDEMIA: ICD-10-CM

## 2024-05-16 LAB
LAB AP CASE REPORT: NORMAL
PATH REPORT.FINAL DX SPEC: NORMAL
PATH REPORT.GROSS SPEC: NORMAL

## 2024-05-16 RX ORDER — ROSUVASTATIN CALCIUM 40 MG/1
40 TABLET, COATED ORAL DAILY
Qty: 90 TABLET | Refills: 1 | Status: SHIPPED | OUTPATIENT
Start: 2024-05-16

## 2024-05-28 DIAGNOSIS — M54.50 CHRONIC BILATERAL LOW BACK PAIN WITHOUT SCIATICA: ICD-10-CM

## 2024-05-28 DIAGNOSIS — G89.29 CHRONIC BILATERAL LOW BACK PAIN WITHOUT SCIATICA: ICD-10-CM

## 2024-05-28 RX ORDER — TIZANIDINE 4 MG/1
4 TABLET ORAL 3 TIMES DAILY
Qty: 90 TABLET | Refills: 1 | Status: SHIPPED | OUTPATIENT
Start: 2024-05-28

## 2024-07-28 DIAGNOSIS — G47.00 INSOMNIA, UNSPECIFIED TYPE: ICD-10-CM

## 2024-07-28 DIAGNOSIS — I10 BENIGN ESSENTIAL HYPERTENSION: ICD-10-CM

## 2024-07-29 RX ORDER — TIZANIDINE HYDROCHLORIDE 4 MG/1
CAPSULE, GELATIN COATED ORAL
Qty: 90 CAPSULE | Refills: 1 | OUTPATIENT
Start: 2024-07-29

## 2024-07-29 RX ORDER — INDAPAMIDE 1.25 MG/1
1.25 TABLET ORAL DAILY
Qty: 90 TABLET | Refills: 1 | Status: SHIPPED | OUTPATIENT
Start: 2024-07-29

## 2024-08-02 DIAGNOSIS — F51.01 PRIMARY INSOMNIA: ICD-10-CM

## 2024-08-02 RX ORDER — DARIDOREXANT 25 MG/1
25 TABLET, FILM COATED ORAL NIGHTLY
Qty: 25 TABLET | Refills: 0 | Status: SHIPPED | OUTPATIENT
Start: 2024-08-02

## 2024-08-04 DIAGNOSIS — G89.29 CHRONIC BILATERAL LOW BACK PAIN WITHOUT SCIATICA: ICD-10-CM

## 2024-08-04 DIAGNOSIS — M54.50 CHRONIC BILATERAL LOW BACK PAIN WITHOUT SCIATICA: ICD-10-CM

## 2024-08-05 RX ORDER — TIZANIDINE 4 MG/1
4 TABLET ORAL 3 TIMES DAILY
Qty: 90 TABLET | Refills: 1 | Status: SHIPPED | OUTPATIENT
Start: 2024-08-05

## 2024-09-03 DIAGNOSIS — G47.00 INSOMNIA, UNSPECIFIED TYPE: ICD-10-CM

## 2024-09-03 DIAGNOSIS — F51.01 PRIMARY INSOMNIA: ICD-10-CM

## 2024-09-03 RX ORDER — DARIDOREXANT 25 MG/1
1 TABLET, FILM COATED ORAL EVERY EVENING
Qty: 25 TABLET | Refills: 0 | Status: SHIPPED | OUTPATIENT
Start: 2024-09-03

## 2024-09-03 RX ORDER — TIZANIDINE HYDROCHLORIDE 4 MG/1
CAPSULE, GELATIN COATED ORAL
Qty: 90 CAPSULE | Refills: 1 | OUTPATIENT
Start: 2024-09-03

## 2024-09-20 ENCOUNTER — OFFICE VISIT (OUTPATIENT)
Dept: FAMILY MEDICINE CLINIC | Facility: CLINIC | Age: 69
End: 2024-09-20
Payer: MEDICARE

## 2024-09-20 VITALS
WEIGHT: 176.8 LBS | HEIGHT: 72 IN | RESPIRATION RATE: 18 BRPM | OXYGEN SATURATION: 98 % | DIASTOLIC BLOOD PRESSURE: 84 MMHG | SYSTOLIC BLOOD PRESSURE: 138 MMHG | HEART RATE: 77 BPM | BODY MASS INDEX: 23.95 KG/M2

## 2024-09-20 DIAGNOSIS — Z23 NEED FOR VACCINATION: ICD-10-CM

## 2024-09-20 DIAGNOSIS — I10 BENIGN ESSENTIAL HYPERTENSION: ICD-10-CM

## 2024-09-20 DIAGNOSIS — B88.0 CHIGGER BITES: ICD-10-CM

## 2024-09-20 DIAGNOSIS — Z00.00 MEDICARE ANNUAL WELLNESS VISIT, SUBSEQUENT: Primary | ICD-10-CM

## 2024-09-20 DIAGNOSIS — R73.9 HYPERGLYCEMIA: ICD-10-CM

## 2024-09-20 DIAGNOSIS — D12.6 ADENOMA OF COLON: ICD-10-CM

## 2024-09-20 DIAGNOSIS — B35.1 ONYCHOMYCOSIS DUE TO DERMATOPHYTE: ICD-10-CM

## 2024-09-20 DIAGNOSIS — E78.2 MIXED HYPERLIPIDEMIA: ICD-10-CM

## 2024-09-20 RX ORDER — CICLOPIROX 80 MG/ML
SOLUTION TOPICAL NIGHTLY
Qty: 6 ML | Refills: 5 | Status: SHIPPED | OUTPATIENT
Start: 2024-09-20

## 2024-10-04 DIAGNOSIS — G89.29 CHRONIC BILATERAL LOW BACK PAIN WITHOUT SCIATICA: ICD-10-CM

## 2024-10-04 DIAGNOSIS — M54.50 CHRONIC BILATERAL LOW BACK PAIN WITHOUT SCIATICA: ICD-10-CM

## 2024-10-11 DIAGNOSIS — F51.01 PRIMARY INSOMNIA: ICD-10-CM

## 2024-10-11 RX ORDER — DARIDOREXANT 25 MG/1
1 TABLET, FILM COATED ORAL EVERY EVENING
Qty: 25 TABLET | Refills: 0 | Status: SHIPPED | OUTPATIENT
Start: 2024-10-11

## 2024-10-28 ENCOUNTER — PATIENT MESSAGE (OUTPATIENT)
Dept: FAMILY MEDICINE CLINIC | Facility: CLINIC | Age: 69
End: 2024-10-28
Payer: MEDICARE

## 2024-10-28 DIAGNOSIS — I63.9 BRAINSTEM STROKE: Primary | ICD-10-CM

## 2024-10-29 ENCOUNTER — TELEPHONE (OUTPATIENT)
Dept: FAMILY MEDICINE CLINIC | Facility: CLINIC | Age: 69
End: 2024-10-29
Payer: MEDICARE

## 2024-10-29 NOTE — TELEPHONE ENCOUNTER
Patient is needing a hospital f/u & I have patient scheduled for next Friday. He wanted to make sure that was soon enough? Also he wanted to know if he should go ahead and schedule with a Neurologist & Cardiologist or wait until he sees Dr. Villa. Please advise.

## 2024-11-08 ENCOUNTER — OFFICE VISIT (OUTPATIENT)
Dept: FAMILY MEDICINE CLINIC | Facility: CLINIC | Age: 69
End: 2024-11-08
Payer: MEDICARE

## 2024-11-08 VITALS
HEIGHT: 72 IN | OXYGEN SATURATION: 98 % | RESPIRATION RATE: 18 BRPM | DIASTOLIC BLOOD PRESSURE: 80 MMHG | BODY MASS INDEX: 23.84 KG/M2 | HEART RATE: 62 BPM | SYSTOLIC BLOOD PRESSURE: 138 MMHG | WEIGHT: 176 LBS

## 2024-11-08 DIAGNOSIS — F51.01 PRIMARY INSOMNIA: ICD-10-CM

## 2024-11-08 DIAGNOSIS — I45.10 RBBB: ICD-10-CM

## 2024-11-08 DIAGNOSIS — R42 VERTIGO: Primary | ICD-10-CM

## 2024-11-08 DIAGNOSIS — I63.9 BRAINSTEM STROKE: ICD-10-CM

## 2024-11-08 PROBLEM — R94.31 ABNORMAL ELECTROCARDIOGRAM (ECG) (EKG): Status: ACTIVE | Noted: 2024-11-06

## 2024-11-08 RX ORDER — CLOPIDOGREL BISULFATE 75 MG/1
75 TABLET ORAL DAILY
Qty: 90 TABLET | Refills: 0 | Status: SHIPPED | OUTPATIENT
Start: 2024-11-08

## 2024-11-08 RX ORDER — DARIDOREXANT 25 MG/1
1 TABLET, FILM COATED ORAL EVERY EVENING
Qty: 25 TABLET | Refills: 0 | Status: SHIPPED | OUTPATIENT
Start: 2024-11-08

## 2024-11-08 RX ORDER — MECLIZINE HYDROCHLORIDE 25 MG/1
TABLET ORAL
COMMUNITY
Start: 2024-10-23

## 2024-11-08 RX ORDER — CLOPIDOGREL BISULFATE 75 MG/1
TABLET ORAL
COMMUNITY
Start: 2024-10-23 | End: 2024-11-08 | Stop reason: SDUPTHER

## 2024-11-08 NOTE — LETTER
November 8, 2024    Emery Kincaid  [address]  [facility] [address]      To Whom It May Concern:     Patient has been told that he cannot travel until his present medical condition which started on October 20, 2024 clears. He is unable to drive until seen by specialist or travel indefinitely, at least until January 1, 2025.    Sincerely,    Marjorie Villa MD

## 2024-11-08 NOTE — PROGRESS NOTES
Emery Kincaid is a 68 y.o. male admitted to [facility] and  is seen for follow up of possible brainstem stroke    Admission date 10/20 D/C date 10/23  Discharge summary is not available.  Risk for Readmission (LACE) No data recorded  Current outpatient and discharge medications have been reconciled for the patient.  Reviewed by: Marjorie Villa MD  He was admitted for the following reason(s):  Primary admitting diagnosis at discharge was unclear. Vertigo, dizziness, possible small brainstem stroke.  Pertinent secondary diagnoses include HTN hyperlipidemia.  Course During Hospital Stay:  Seen by cardiology and teleneurology. No definitive diagnosis made  Procedures Performed in Hospital : Echo , CT of head and neck, and MRI of head -- I do not have results of anything but the CTs  Pending tests : requested from    Pain Control:  no pain  Diet: Previous  Activity after Discharge: activity as tolerated and he needs to not do strenuous exercise until seen by neurology    Items to be addressed at first follow up visit include:  1. Medication changes: Plavix and Lopressor  2.     He reports his overall condition are improving since discharge.  Specific complaints: Still with some mild vertigo with head turning.  Social support is said to be adequate to meet his needs. .      The patient is a 68-year-old male who presents for evaluation of vertigo.    He reports a slower improvement in his condition, attributing it to milder symptoms. He recalls an incident where he consumed three cups of coffee within a few hours, engaged in a 1.5-hour workout, and spent an hour in the sun before going grocery shopping. During this outing, he experienced a sudden onset of symptoms, including difficulty standing and walking, which he attributes to dehydration. He reports no loss of consciousness.     He was informed that he did not experience a Transient Ischemic Attack (TIA), but his discharge summary  "indicated otherwise. His medical team, including a physical therapist, cardiologist, nurse practitioner, and regular doctors, suggested an inner ear issue as the cause of his symptoms, as no abnormalities were detected on his MRI or CT scans. He underwent MRI and was informed that he had ischemia in the brainstem, but it was not considered problematic. His carotid arteries were found to be normal on a calcium scan.    He was prescribed Plavix and Lopressor for 30 days upon discharge from the hospital. He was also informed of four mild anomalies in his heart, including left ventricle stiffness, right bundle branch block, and aortic valve leakage.  He was advised to undergo physical therapy, but he has shown significant improvement and is now able to walk around his neighborhood independently.    He has an upcoming appointment with a cardiac nurse practitioner he saw in the hospital. He is currently not driving and is considering carrying chewable aspirin for emergencies. He is also considering seeing an ENT specialist.     Objective   Vitals:    11/08/24 1434   BP: 138/80   Pulse: 62   Resp: 18   SpO2: 98%   Weight: 79.8 kg (176 lb)   Height: 182.9 cm (72\")     Body mass index is 23.87 kg/m².    Physical Exam  Vitals reviewed.   Constitutional:       Appearance: Normal appearance. He is well-developed.   Neurological:      Mental Status: He is alert.      Motor: No weakness.      Gait: Gait normal.   Psychiatric:         Behavior: Behavior normal.         Thought Content: Thought content normal.         Judgment: Judgment normal.             1. Vertigo.  The patient's symptoms and clinical presentation are consistent with vertigo. He reports experiencing dizziness and vertigo, which seem to be improving gradually. The MRI did not show any stroke-related abnormalities, though there was a mention of ischemia in the brainstem, which was not considered problematic. A referral to a neurologist has been made for further " evaluation and management. He has been advised to engage in light resistance training and to avoid strenuous activities.    Possible Brainstem stroke    2. Right Bundle Branch Block.  The patient has a history of right bundle branch block, identified during his recent hospitalization.     3. Aortic Valve Leakage.  The patient has mild aortic valve leakage, noted during his recent hospitalization. This is not considered significant due to the high sensitivity of modern ultrasound technology. No immediate intervention is required, but continued monitoring is recommended. I do not have a copy of the echo but have requested    4. Left Ventricular Stiffness.  The patient has left ventricular stiffness, referred to as inferior dysfunction. This was identified during his recent hospitalization. No immediate intervention is required, but continued monitoring is recommended.    5. Medication Management.  He is currently on Plavix and Lopressor, with a 30-day supply provided upon discharge from the hospital. He inquired about renewing these medications. Plavix is intended to prevent further strokes, and he was advised that it is generally safe for long-term use, though monitoring for bleeding is necessary. Lopressor should be continued as prescribed.           {Followup: 23]      Patient or patient representative verbalized consent for the use of Ambient Listening during the visit with  Marjorie Villa MD for chart documentation. 11/8/2024  17:18 EST

## 2024-11-14 ENCOUNTER — PATIENT MESSAGE (OUTPATIENT)
Dept: FAMILY MEDICINE CLINIC | Facility: CLINIC | Age: 69
End: 2024-11-14
Payer: MEDICARE

## 2024-11-14 DIAGNOSIS — I10 BENIGN ESSENTIAL HYPERTENSION: Primary | ICD-10-CM

## 2024-11-20 ENCOUNTER — EXTERNAL PBMM DATA (OUTPATIENT)
Dept: PHARMACY | Facility: OTHER | Age: 69
End: 2024-11-20
Payer: MEDICARE

## 2024-11-22 RX ORDER — METOPROLOL SUCCINATE 25 MG/1
25 TABLET, EXTENDED RELEASE ORAL DAILY
Qty: 90 TABLET | Refills: 1 | Status: SHIPPED | OUTPATIENT
Start: 2024-11-22

## 2024-11-22 RX ORDER — METOPROLOL SUCCINATE 25 MG/1
25 TABLET, EXTENDED RELEASE ORAL DAILY
COMMUNITY
Start: 2024-10-23 | End: 2024-11-22 | Stop reason: SDUPTHER

## 2024-12-02 DIAGNOSIS — F51.01 PRIMARY INSOMNIA: ICD-10-CM

## 2024-12-02 RX ORDER — DARIDOREXANT 25 MG/1
1 TABLET, FILM COATED ORAL EVERY EVENING
Qty: 25 TABLET | Refills: 0 | Status: SHIPPED | OUTPATIENT
Start: 2024-12-02

## 2024-12-11 ENCOUNTER — POP HEALTH PHARMACY (OUTPATIENT)
Dept: PHARMACY | Facility: OTHER | Age: 69
End: 2024-12-11
Payer: MEDICARE

## 2024-12-11 DIAGNOSIS — E78.2 MIXED HYPERLIPIDEMIA: ICD-10-CM

## 2024-12-11 RX ORDER — ROSUVASTATIN CALCIUM 40 MG/1
40 TABLET, COATED ORAL DAILY
Qty: 90 TABLET | Refills: 1 | Status: SHIPPED | OUTPATIENT
Start: 2024-12-11

## 2024-12-11 NOTE — PROGRESS NOTES
Population Health Pharmacy Outreach      Emery Kincaid was called today to discuss medication adherence with ROSUVASTATIN CALCIUM ([facility] COA REDUCTASE INHIBITORS) , as he was identified as having care opportunities.    Program Details    [facility]  Status: Enrolled  Effective Dates: 12/9/2024 - present  Responsible Staff: Margo Patino LPN          Opportunities Identified    Adherence- Cholesterol       Adherence and Medication Management    Adherence Questions   Patient Reported X Missed Doses in the Last 7 Days : 0  Reasons for Non-Adherence : No problems identified  Interested in a 90 day supply? : Yes  Does this require clinical escalation to a pharmacist?: Y (Needs an authorized refill for next dispense. [facility].  Pt. requested to contact physician for authorization.)         General Medication Management    Type of medication management: targeted medication review  Review Completed on: 12/11/24  Referred by: pharmacist  Provider: licensed practical nurse  Visit type: initial  Method of contact: by telephone           Medication Therapy Problems     Medication Therapy Recommendations  No medication therapy recommendations to display      Summary    Medication Management Summary    Topics discussed: adherence and missed doses discussed, reminder to refill or  medication discussed         Margo Patino LPN, 12/11/24, 11:48 AM EST.

## 2024-12-11 NOTE — PROGRESS NOTES
Documented in patient's chart reason for refill request and refill sent appropriately to preferred pharmacy.

## 2024-12-11 NOTE — TELEPHONE ENCOUNTER
Refill requested for rosuvastatin via message to clinical pool from Ashlie Hernandez, PharmD, Population Health Pharmacist with the [facility]. Refill sent to [facility]'s preferred [facility] [address]

## 2024-12-23 ENCOUNTER — POP HEALTH PHARMACY (OUTPATIENT)
Dept: PHARMACY | Facility: OTHER | Age: 69
End: 2024-12-23
Payer: MEDICARE

## 2024-12-27 ENCOUNTER — POP HEALTH PHARMACY (OUTPATIENT)
Dept: PHARMACY | Facility: OTHER | Age: 69
End: 2024-12-27
Payer: MEDICARE

## 2024-12-27 DIAGNOSIS — F51.01 PRIMARY INSOMNIA: ICD-10-CM

## 2024-12-27 RX ORDER — DARIDOREXANT 25 MG/1
1 TABLET, FILM COATED ORAL EVERY EVENING
Qty: 25 TABLET | Refills: 0 | Status: SHIPPED | OUTPATIENT
Start: 2024-12-27

## 2025-01-22 DIAGNOSIS — F51.01 PRIMARY INSOMNIA: ICD-10-CM

## 2025-01-24 RX ORDER — DARIDOREXANT 25 MG/1
1 TABLET, FILM COATED ORAL EVERY EVENING
Qty: 25 TABLET | Refills: 0 | Status: SHIPPED | OUTPATIENT
Start: 2025-01-24

## 2025-02-03 ENCOUNTER — LAB (OUTPATIENT)
Dept: LAB | Facility: HOSPITAL | Age: 70
End: 2025-02-03
Payer: MEDICARE

## 2025-02-03 ENCOUNTER — OFFICE VISIT (OUTPATIENT)
Dept: NEUROLOGY | Facility: CLINIC | Age: 70
End: 2025-02-03
Payer: MEDICARE

## 2025-02-03 VITALS
DIASTOLIC BLOOD PRESSURE: 86 MMHG | RESPIRATION RATE: 20 BRPM | WEIGHT: 173 LBS | HEIGHT: 72 IN | BODY MASS INDEX: 23.43 KG/M2 | OXYGEN SATURATION: 98 % | SYSTOLIC BLOOD PRESSURE: 148 MMHG | HEART RATE: 86 BPM

## 2025-02-03 DIAGNOSIS — Z51.81 THERAPEUTIC DRUG MONITORING: ICD-10-CM

## 2025-02-03 DIAGNOSIS — Z86.73 HISTORY OF ISCHEMIC STROKE: Primary | ICD-10-CM

## 2025-02-03 LAB — PA ADP PRP-ACNC: 76 PRU (ref 194–418)

## 2025-02-03 PROCEDURE — 36415 COLL VENOUS BLD VENIPUNCTURE: CPT | Performed by: PSYCHIATRY & NEUROLOGY

## 2025-02-03 PROCEDURE — 85576 BLOOD PLATELET AGGREGATION: CPT | Performed by: PSYCHIATRY & NEUROLOGY

## 2025-02-03 RX ORDER — THEANINE 100 MG
CAPSULE ORAL
COMMUNITY
Start: 2023-01-30

## 2025-02-03 NOTE — PROGRESS NOTES
Patient ID: Emery Kincaid  Age: 69 y.o.   Chief compliant:   Chief Complaint   Patient presents with    Stroke       Initial HPI (2/3/2025):    Emery Kincaid is a 69 y.o. right-handed male with HTN, HLD, RBBB, history of ischemic stroke who presents for evaluation of stroke. Event occurred on October 20th 2024. He had 3 cups of coffee that morning over the course of a few hours which is fairly normal and went to exercise at the gym. He felt okay. He did spend some time outside in the sun. He was pushing a cart and felt a little dizzy described as a spinning sensation. Then over the course of seconds he felt he was unable to walk and was veering the cart back and forth. No weakness, numbness, double vision, trouble swallowing or speaking. He felt slightly better after sitting outside. He believes EMT told him he was dehydrated based on his skin turgor. He had persistent symptoms that didn't have full resolution for about a week. He had to be very careful when he walked, although could walk independently on day 3. He feels back to normal. He recalls the neurologist saw him televisit and had concern for stroke. He reports a therapist commented on it being a strange case. TPA was offered but ultimately refused. Hypertensive to 190/105. Given ASA and Plavix and still taking this. He was on aspirin prior to going to the hospital. He was Crestor 40 mg was continued. CTA noted moderate left vertebral atherosclerosis. LDL 81.8, A1C 5.0. He reports he has been on blood pressure medication with variable blood pressures that can occasionally be too low. MRI 11/16/24 negative, but due to concern for brainstem ischemia he was treated for such. He did not see neurology in follow up. [facility] cardiology in follow up    He enjoys flying airplanes at [employer]. He hasn't flown for 3 months. He rode with friends multiple times since and didn't have symptoms. He has returned to driving.       Pertinent FHx/Social: no tobacco  use. No significant family history    Review of Systems   HENT:  Negative for hearing loss, tinnitus and trouble swallowing.    Musculoskeletal:  Negative for gait problem and neck pain.   Neurological:  Positive for dizziness. Negative for speech difficulty, weakness, numbness and headaches.   Psychiatric/Behavioral:  Negative for behavioral problems and confusion.         Vitals:    02/03/25 1429   BP: 148/86   Pulse: 86   Resp: 20   SpO2: 98%       The following portions of the patient's history were reviewed and updated as appropriate: allergies, current medications, past family history, past medical history, past social history, past surgical history and problem list.    Neurological Exam  Mental Status  Awake, alert and oriented to person, place and time. Recent and remote memory are intact. Speech is normal. Language is fluent with no aphasia. Attention and concentration are normal. Fund of knowledge is appropriate for level of education.    Cranial Nerves  CN II: Visual acuity is normal. Visual fields full to confrontation.  CN III, IV, VI: Normal lids and orbits bilaterally. Pupils equal round and reactive to light bilaterally. Nonsustained horizontal and torsional nystagmus noted on right gaze, suppressed on the left.  CN V: Facial sensation is normal.  CN VII: Full and symmetric facial movement.  CN IX, X: Palate elevates symmetrically  CN XI: Shoulder shrug strength is normal.  CN XII: Tongue midline without atrophy or fasciculations.    Motor  Normal muscle bulk throughout. Normal muscle tone. Strength is 5/5 in all four extremities except as noted.    Sensory  Light touch is normal in upper and lower extremities. Pinprick is normal in upper and lower extremities.     Reflexes                                            Right                      Left  Brachioradialis                    2+                         2+  Biceps                                 2+                         2+  Triceps                                 2+                         2+  Patellar                                2+                         2+  Achilles                                2+                         2+  Right Plantar: downgoing  Left Plantar: downgoing    Right pathological reflexes: Ankle clonus absent.  Left pathological reflexes: Ankle clonus absent.    Coordination    Finger-to-nose, rapid alternating movements and heel-to-shin normal bilaterally without dysmetria.    Gait  Casual gait is normal including stance, stride, and arm swing. Romberg is absent.      Imaging: Radiology report reviewed: MRI brain, CTA head and neck  Labs reviewed including CMP, CBC, A1C, LDL    Assessment/Plan:    Emery Kincaid is a 69 y.o. male presenting to establish neurological care regarding concern for ischemic stroke diagnosed at [facility] despite negative MRI. Symptoms are compelling in terms of sudden onset of inability to walk, elevated BP (190/105), and moderate posterior circulation atherosclerosis along with vascular risk factors raises concern for non-imaged brainstem ischemia to subcortical locations including the pontomedullary junction. Time course of symptoms is not compelling for a vestibular neuritis.  He has remained on DAPT since his hospitalization which is unlikely to provide benefit greater than risk.        Diagnoses and all orders for this visit:    1. History of ischemic stroke (Primary)  -     P2Y12 Platelet Inhibition    2. Therapeutic drug monitoring  -     P2Y12 Platelet Inhibition         Plan:  P2Y12 level. If level is low as expected, will discontinue ASA  Continue Crestor 40 mg. Due to LDL not at goal, would consider addition of Zetia or PCSK9 inhibitor as indicated  Discussed that he appears low risk for recurrence of identical symptoms due to highest concern for stroke as opposed to unidentified etiology and further optimization of stroke risk factors which has been performed.    Nile Richardson MD            I  spent 71 minutes caring for this patient on this date of service. This time includes time spent by me in the following activities as necessary: preparing for the visit, reviewing tests, medical records and previous visits, obtaining and/or reviewing a separately obtained history, performing a medically appropriate exam and/or evaluation, counseling and educating the patient, and/or communicating with other healthcare professionals, documenting information in the medical record, independently interpreting results and communicating that information with the patient, and developing a medically appropriate treatment plan with consideration of other conditions, medications, and treatments.

## 2025-02-05 ENCOUNTER — PATIENT ROUNDING (BHMG ONLY) (OUTPATIENT)
Dept: NEUROLOGY | Facility: CLINIC | Age: 70
End: 2025-02-05
Payer: MEDICARE

## 2025-02-13 DIAGNOSIS — I10 BENIGN ESSENTIAL HYPERTENSION: ICD-10-CM

## 2025-02-13 NOTE — TELEPHONE ENCOUNTER
Patient comment on refill request: In the past, I had a prescription for 3 tablets per day.  Based on my feedback, that was reduced to one per day.  I now check my BP daily and take more Losartan when it is high.  As a result, I am using the prescription up early.  Can we go back to 2-3 per day, whatever Dr. Villa agrees with?  I am within a few days of running out.  Thanks, Emery Macias to PCP for advice on changing sig of medication.  LAST REFILL - 11/22/24 - 90 day supply with 1 refill, patient suggests he is taking more than is prescribed per comment  LAST VISIT - 11/08/24  NEXT VISIT - 03/24/25

## 2025-02-14 RX ORDER — LOSARTAN POTASSIUM 25 MG/1
75 TABLET ORAL DAILY
Qty: 270 TABLET | Refills: 1 | Status: SHIPPED | OUTPATIENT
Start: 2025-02-14

## 2025-02-17 RX ORDER — CLOPIDOGREL BISULFATE 75 MG/1
75 TABLET ORAL DAILY
Qty: 90 TABLET | Refills: 0 | Status: SHIPPED | OUTPATIENT
Start: 2025-02-17

## 2025-02-18 ENCOUNTER — TELEPHONE (OUTPATIENT)
Dept: FAMILY MEDICINE CLINIC | Facility: CLINIC | Age: 70
End: 2025-02-18
Payer: MEDICARE

## 2025-02-18 RX ORDER — LOSARTAN POTASSIUM 50 MG/1
50 TABLET ORAL DAILY
Qty: 180 TABLET | Refills: 1 | Status: SHIPPED | OUTPATIENT
Start: 2025-02-18 | End: 2025-02-20 | Stop reason: SDUPTHER

## 2025-02-18 NOTE — TELEPHONE ENCOUNTER
Pharmacy called on patients Losartan 25mg he is supposed to take 3 a day insurance will not cover 3 tabs daily asking to send in a 50mg to add on please advise

## 2025-02-20 ENCOUNTER — TELEPHONE (OUTPATIENT)
Dept: FAMILY MEDICINE CLINIC | Facility: CLINIC | Age: 70
End: 2025-02-20
Payer: MEDICARE

## 2025-02-20 RX ORDER — LOSARTAN POTASSIUM 50 MG/1
TABLET ORAL
Qty: 180 TABLET | Refills: 1 | Status: SHIPPED | OUTPATIENT
Start: 2025-02-20

## 2025-02-20 NOTE — TELEPHONE ENCOUNTER
[facility] that [facility] for the refill of;    Losartan 50mg are missing or unclear.

## 2025-03-13 DIAGNOSIS — F51.01 PRIMARY INSOMNIA: ICD-10-CM

## 2025-03-13 RX ORDER — DARIDOREXANT 25 MG/1
1 TABLET, FILM COATED ORAL EVERY EVENING
Qty: 25 TABLET | Refills: 0 | Status: SHIPPED | OUTPATIENT
Start: 2025-03-13

## 2025-03-14 DIAGNOSIS — I10 BENIGN ESSENTIAL HYPERTENSION: ICD-10-CM

## 2025-03-14 RX ORDER — INDAPAMIDE 1.25 MG/1
1.25 TABLET ORAL DAILY
Qty: 90 TABLET | Refills: 1 | Status: SHIPPED | OUTPATIENT
Start: 2025-03-14

## 2025-03-23 DIAGNOSIS — E78.2 MIXED HYPERLIPIDEMIA: ICD-10-CM

## 2025-03-24 RX ORDER — ROSUVASTATIN CALCIUM 40 MG/1
40 TABLET, COATED ORAL DAILY
Qty: 90 TABLET | Refills: 1 | Status: SHIPPED | OUTPATIENT
Start: 2025-03-24

## 2025-04-02 ENCOUNTER — OFFICE VISIT (OUTPATIENT)
Dept: FAMILY MEDICINE CLINIC | Facility: CLINIC | Age: 70
End: 2025-04-02
Payer: MEDICARE

## 2025-04-02 VITALS
HEART RATE: 58 BPM | RESPIRATION RATE: 18 BRPM | DIASTOLIC BLOOD PRESSURE: 82 MMHG | WEIGHT: 169 LBS | SYSTOLIC BLOOD PRESSURE: 138 MMHG | BODY MASS INDEX: 22.89 KG/M2 | OXYGEN SATURATION: 100 % | HEIGHT: 72 IN

## 2025-04-02 DIAGNOSIS — E78.2 MIXED HYPERLIPIDEMIA: ICD-10-CM

## 2025-04-02 DIAGNOSIS — H93.92 LESION OF LEFT EAR: Primary | ICD-10-CM

## 2025-04-02 DIAGNOSIS — R73.9 HYPERGLYCEMIA: ICD-10-CM

## 2025-04-02 NOTE — ASSESSMENT & PLAN NOTE
NMR looks satisfactory to me but neurology wanted his LDL lower. TIMOTHY LipoProfile (03/31/2025 08:02)

## 2025-04-02 NOTE — PROGRESS NOTES
Assessment & Plan  Cardiovascular risk.  His LDL size is within the normal range, but he has elevated Lp (a) levels. He is currently on rosuvastatin 40 mg. He was advised to consult with specialists at the [facility] or [facility] for further evaluation and management of his elevated Lp (a) levels. He was also informed that niacin may improve his lipid profile, but there is no conclusive evidence to suggest it reduces cardiovascular events.    Elevated Lp (a) levels.  He was advised to consult with specialists at the [facility] or [facility] for further evaluation and management of his elevated Lp (a) levels.    Sleep issues.  He was recommended to use an Oura ring for more accurate monitoring of his sleep patterns.    Skin lesions.  A referral to a dermatologist will be made for further evaluation of the lesion on his ear, which could potentially be a squamous cell carcinoma.        Mixed hyperlipidemia   NMR looks satisfactory to me but neurology wanted his LDL lower. NMR LipoProfile (03/31/2025 08:02)          Hyperglycemia      Lab 03/31/25  0802   HEMOGLOBIN A1C 5.4    Doing OK here          Patient was given instructions and counseling regarding his condition or for health maintenance advice. Please see specific information pulled into the AVS if appropriate.          Emery is a 69 y.o. being seen today for  Hypertension and Hyperlipidemia   HISTORY    Hypertension  This is a chronic problem. The current episode started more than 1 year ago. The problem has been stable since onset. Pertinent negatives include no anxiety, blurred vision, chest pain, headaches, malaise/fatigue, orthopnea, palpitations, peripheral edema or shortness of breath. There are no associated agents to hypertension. There are no compliance problems.          The patient is a 69-year-old male who presents for evaluation of cardiovascular risk, elevated Lp (a), sleep issues, and skin lesions.    He has been under the  care of a nurse practitioner at [facility], who has recommended an angiogram. He is considering this procedure but is also exploring the possibility of a stress test or CT angiogram as initial diagnostic steps. He is currently on rosuvastatin 40 mg and is contemplating the addition of over-the-counter niacin to his regimen. His bilirubin levels have shown improvement since starting rosuvastatin. He has expressed interest in traveling to [place] and is seeking advice on necessary vaccinations. He is also planning to resume flying, a hobby he had previously discontinued due to health concerns. His neurologist has expressed satisfaction with his current health status.    He has a history of elevated Lp (a) levels, which were previously measured at 99 during a visit to [facility]. He was informed that this could potentially increase his cardiovascular risk.    He has been monitoring his sleep patterns using a watch and has noticed frequent awakenings. He is seeking advice on how to improve his sleep quality.    He has requested an examination of a spot on his back to ensure it is not indicative of a serious condition. Additionally, he has reported a persistent sore on his ear, which has developed a scab and causes him discomfort. He does not currently have a dermatologist.       Social History  He  reports that he has never smoked. He has never used smokeless tobacco. He reports current alcohol use of about 12.0 standard drinks of alcohol per week. He reports that he does not use drugs.  EXAM DATA    Vital Signs        BP Readings from Last 1 Encounters:   04/02/25 138/82     Wt Readings from Last 3 Encounters:   04/02/25 76.7 kg (169 lb)   02/03/25 78.5 kg (173 lb)   11/08/24 79.8 kg (176 lb)   Body mass index is 22.92 kg/m².  Physical Exam  Vitals reviewed.   Constitutional:       Appearance: Normal appearance. He is well-developed.   Cardiovascular:      Rate and Rhythm: Normal rate and regular rhythm.       Heart sounds: No murmur heard.  Pulmonary:      Effort: Pulmonary effort is normal.      Breath sounds: Normal breath sounds. No wheezing.   Skin:     Findings: Lesion (Left ear top of pinna with crusted lesion; multiple benign seb k's on back) present.   Neurological:      Mental Status: He is alert and oriented to person, place, and time.   Psychiatric:         Mood and Affect: Mood normal.         Behavior: Behavior normal.         Thought Content: Thought content normal.         Judgment: Judgment normal.               Patient or patient representative verbalized consent for the use of Ambient Listening during the visit with  Marjorie Villa MD for chart documentation. 4/2/2025  10:39 EDT

## 2025-04-04 ENCOUNTER — PATIENT ROUNDING (BHMG ONLY) (OUTPATIENT)
Dept: FAMILY MEDICINE CLINIC | Facility: CLINIC | Age: 70
End: 2025-04-04
Payer: MEDICARE

## 2025-04-04 NOTE — PROGRESS NOTES
A GetFeedback message has been sent to the patient for PATIENT ROUNDING with [facility].    
Statement Selected

## 2025-04-13 DIAGNOSIS — F51.01 PRIMARY INSOMNIA: ICD-10-CM

## 2025-04-14 RX ORDER — DARIDOREXANT 25 MG/1
1 TABLET, FILM COATED ORAL EVERY EVENING
Qty: 25 TABLET | Refills: 0 | Status: SHIPPED | OUTPATIENT
Start: 2025-04-14

## 2025-05-04 DIAGNOSIS — G89.29 CHRONIC BILATERAL LOW BACK PAIN WITHOUT SCIATICA: ICD-10-CM

## 2025-05-04 DIAGNOSIS — M54.50 CHRONIC BILATERAL LOW BACK PAIN WITHOUT SCIATICA: ICD-10-CM

## 2025-05-16 DIAGNOSIS — F51.01 PRIMARY INSOMNIA: ICD-10-CM

## 2025-05-16 RX ORDER — DARIDOREXANT 25 MG/1
1 TABLET, FILM COATED ORAL EVERY EVENING
Qty: 25 TABLET | Refills: 0 | Status: SHIPPED | OUTPATIENT
Start: 2025-05-16

## 2025-05-18 DIAGNOSIS — I10 BENIGN ESSENTIAL HYPERTENSION: ICD-10-CM

## 2025-05-19 RX ORDER — METOPROLOL SUCCINATE 25 MG/1
25 TABLET, EXTENDED RELEASE ORAL DAILY
Qty: 90 TABLET | Refills: 1 | Status: SHIPPED | OUTPATIENT
Start: 2025-05-19

## 2025-05-23 RX ORDER — CLOPIDOGREL BISULFATE 75 MG/1
75 TABLET ORAL DAILY
Qty: 90 TABLET | Refills: 1 | Status: SHIPPED | OUTPATIENT
Start: 2025-05-23 | End: 2025-05-23 | Stop reason: SDUPTHER

## 2025-05-23 RX ORDER — CLOPIDOGREL BISULFATE 75 MG/1
75 TABLET ORAL DAILY
Qty: 90 TABLET | Refills: 1 | Status: SHIPPED | OUTPATIENT
Start: 2025-05-23

## 2025-06-24 DIAGNOSIS — F51.01 PRIMARY INSOMNIA: ICD-10-CM

## 2025-06-24 RX ORDER — DARIDOREXANT 25 MG/1
1 TABLET, FILM COATED ORAL EVERY EVENING
Qty: 25 TABLET | Refills: 0 | Status: SHIPPED | OUTPATIENT
Start: 2025-06-24

## 2025-06-30 ENCOUNTER — OFFICE VISIT (OUTPATIENT)
Dept: FAMILY MEDICINE CLINIC | Facility: CLINIC | Age: 70
End: 2025-06-30
Payer: MEDICARE

## 2025-06-30 VITALS
SYSTOLIC BLOOD PRESSURE: 124 MMHG | OXYGEN SATURATION: 98 % | HEART RATE: 70 BPM | HEIGHT: 72 IN | WEIGHT: 172.5 LBS | RESPIRATION RATE: 16 BRPM | DIASTOLIC BLOOD PRESSURE: 86 MMHG | BODY MASS INDEX: 23.36 KG/M2

## 2025-06-30 DIAGNOSIS — E78.2 MIXED HYPERLIPIDEMIA: Primary | ICD-10-CM

## 2025-06-30 DIAGNOSIS — I10 BENIGN ESSENTIAL HYPERTENSION: ICD-10-CM

## 2025-06-30 DIAGNOSIS — R06.83 LOUD SNORING: ICD-10-CM

## 2025-06-30 PROCEDURE — 3074F SYST BP LT 130 MM HG: CPT | Performed by: FAMILY MEDICINE

## 2025-06-30 PROCEDURE — 99214 OFFICE O/P EST MOD 30 MIN: CPT | Performed by: FAMILY MEDICINE

## 2025-06-30 PROCEDURE — 1159F MED LIST DOCD IN RCRD: CPT | Performed by: FAMILY MEDICINE

## 2025-06-30 PROCEDURE — G2211 COMPLEX E/M VISIT ADD ON: HCPCS | Performed by: FAMILY MEDICINE

## 2025-06-30 PROCEDURE — 1160F RVW MEDS BY RX/DR IN RCRD: CPT | Performed by: FAMILY MEDICINE

## 2025-06-30 PROCEDURE — 3079F DIAST BP 80-89 MM HG: CPT | Performed by: FAMILY MEDICINE

## 2025-06-30 PROCEDURE — 1126F AMNT PAIN NOTED NONE PRSNT: CPT | Performed by: FAMILY MEDICINE

## 2025-06-30 NOTE — ASSESSMENT & PLAN NOTE
BP is controlled well. He will recheck in six months. He will continue present meds. Prescription will be sent when notified by pharmacy..

## 2025-06-30 NOTE — PROGRESS NOTES
Assessment & Plan  Suspected sleep apnea.  Reports snoring loudly enough to disturb others, which is a potential sign of sleep apnea. Referral for a home sleep study will be made to evaluate for sleep apnea. If diagnosed, treatment options such as CPAP therapy or other interventions will be considered. Discussed that an Apple watch cannot detect sleep apnea; home sleep study equipment will be provided.    Skin lesion.  Reports that a previously concerning skin lesion has disappeared. No further action is required at this time. Advised to monitor for any new or recurring lesions and report them promptly. Patient mentioned family history of skin cancer; will remain vigilant.    Hyperlipidemia.  Currently on pravastatin 40 mg and reports no side effects. LDL level was previously recorded at 75 in 0325. A blood test will be conducted today to assess his current lipid profile. Depending on the results, adjustments to his medication regimen may be considered to achieve an LDL level of 70 or better.    Medication management.  Reports alternating between cutivec and tizanidine for sleep issues. Advised to use ibuprofen sparingly due to potential for raising blood pressure and causing stomach issues. Discussed potential benefits and limitations of current medications and possible new treatments for sleep issues.        Mixed hyperlipidemia       Orders:    Lipid Panel With LDL / HDL Ratio    Loud snoring    Orders:    Ambulatory Referral to Sleep Medicine    Benign essential hypertension              Patient was given instructions and counseling regarding his condition or for health maintenance advice. Please see specific information pulled into the AVS if appropriate.          Emery is a 69 y.o. being seen today for  Hypertension and Hyperlipidemia   HISTORY    HPI      The patient presents for evaluation of sleep apnea, a skin lesion, and hyperlipidemia.    He has been experiencing sleep disturbances and suspects  that sleep apnea may be the cause. He reports that both he and his brother snore, which he understands can be a symptom of sleep apnea. He inquired if his Apple watch could detect this issue.    He has noticed the disappearance of a small spot on his skin that previously caused discomfort. He has not yet consulted a rheumatologist regarding this change. He expresses concern about the possibility of skin cancer, given his mother's history of the disease in her 70s, 80s, and 90s.    He is currently on pravastatin 40 mg, which he tolerates well without any side effects. His neurologist has advised him to maintain an LDL level of 70 or lower. He has not had his cholesterol levels checked since starting pravastatin in 0325. He discontinued rosuvastatin due to muscle-related side effects.    He finds relief from Qulipta and tizanidine, which he alternates to avoid overuse. He believes that using them every other night is more effective than nightly use. He also mentions that ibuprofen provides some relief, but he is cautious about its frequent use due to potential side effects.    FAMILY HISTORY  His mother had skin cancer in her 70s, 80s, and possibly 90s.     Social History  He  reports that he has never smoked. He has never used smokeless tobacco. He reports current alcohol use of about 12.0 standard drinks of alcohol per week. He reports that he does not use drugs.  EXAM DATA    Vital Signs        BP Readings from Last 1 Encounters:   06/30/25 124/86     Wt Readings from Last 3 Encounters:   06/30/25 78.2 kg (172 lb 8 oz)   04/02/25 76.7 kg (169 lb)   02/03/25 78.5 kg (173 lb)   Body mass index is 23.4 kg/m².  Physical Exam      General Appearance: Normal.  HEENT:   Respiratory: Within normal limits.  Cardiovascular: Within normal limits.  Skin: Examined skin lesion, no abnormalities noted.  Neurological:   Psychiatric: Normal judgement and affect.            Patient or patient representative verbalized consent for  the use of Ambient Listening during the visit with  Marjorie Villa MD for chart documentation. 6/30/2025  12:37 EDT

## 2025-06-30 NOTE — ASSESSMENT & PLAN NOTE
Would like to see if the pravachol is strong enough. Neurologist would like his LDL < 70.     Orders:    Lipid Panel With LDL / HDL Ratio

## 2025-06-30 NOTE — PROGRESS NOTES
{CC Problem List  Visit Diagnosis   ROS  Review (Popup)  Health Maintenance  Quality  BestPractice  Medications  SmartSets  SnapShot Encounters  Media :23}     Assessment & Plan          Benign essential hypertension  BP is controlled well. He will recheck in six months. He will continue present meds. Prescription will be sent when notified by pharmacy..           Suspected sleep apnea.  Reports snoring loudly enough to disturb others, which is a potential sign of sleep apnea. Referral for a home sleep study will be made to evaluate for sleep apnea. If diagnosed, treatment options such as CPAP therapy or other interventions will be considered. Discussed that an Apple watch cannot detect sleep apnea; home sleep study equipment will be provided.    Skin lesion.  Reports that a previously concerning skin lesion has disappeared. No further action is required at this time. Advised to monitor for any new or recurring lesions and report them promptly. Patient mentioned family history of skin cancer; will remain vigilant.    Hyperlipidemia.  Currently on pravastatin 40 mg and reports no side effects. LDL level was previously recorded at 75 in 0325. A blood test will be conducted today to assess his current lipid profile. Depending on the results, adjustments to his medication regimen may be considered to achieve an LDL level of 70 or better.    Medication management.  Reports alternating between cutivec and tizanidine for sleep issues. Advised to use ibuprofen sparingly due to potential for raising blood pressure and causing stomach issues. Discussed potential benefits and limitations of current medications and possible new treatments for sleep issues.   Patient was given instructions and counseling regarding his condition or for health maintenance advice. Please see specific information pulled into the AVS if appropriate.     {Problem List  Visit Diagnosis   Encounters  Notes  Medications  Labs  Result  Review Imaging  Media :23}   {Health Maintenance Check  Quality :23}  Emery is a 69 y.o. being seen today for  Hypertension and Hyperlipidemia {Chief Complaint :23}  HISTORY    Hypertension  Chronicity:  Chronic  Onset:  More than 1 year ago  Progression since onset:  Stable  Condition status:  Controlled  Associated symptoms: no anxiety, no blurred vision, no chest pain, no headaches, no malaise/fatigue, no orthopnea, no palpitations, no peripheral edema and no shortness of breath    Agents associated with hypertension:  No associated agents  CAD risks:  Dyslipidemia and family history  Compliance problems:  No compliance problems      The patient presents for evaluation of sleep apnea, a skin lesion, and hyperlipidemia.    He has been experiencing sleep disturbances and suspects that sleep apnea may be the cause. He reports that both he and his brother snore, which he understands can be a symptom of sleep apnea. He inquired if his Apple watch could detect this issue.    He has noticed the disappearance of a small spot on his skin that previously caused discomfort. He has not yet consulted a dermatologist regarding this change. He expresses concern about the possibility of skin cancer, given his mother's history of the disease in her 70s, 80s, and 90s.    He is currently on pravastatin 40 mg, which he tolerates well without any side effects. His neurologist has advised him to maintain an LDL level of 70 or lower. He has not had his cholesterol levels checked since starting pravastatin in 03/25. He discontinued rosuvastatin due to muscle-related side effects.    He finds relief from insomnia from Qulipta and tizanidine, which he alternates to avoid overuse. He believes that using them every other night is more effective than nightly use. He also mentions that ibuprofen provides some relief, but he is cautious about its frequent use due to potential side effects.  Social History  He  reports that he has never  smoked. He has never used smokeless tobacco. He reports current alcohol use of about 12.0 standard drinks of alcohol per week. He reports that he does not use drugs.  EXAM DATA    Vital Signs        BP Readings from Last 1 Encounters:   06/30/25 124/86     Wt Readings from Last 3 Encounters:   06/30/25 78.2 kg (172 lb 8 oz)   04/02/25 76.7 kg (169 lb)   02/03/25 78.5 kg (173 lb)   Body mass index is 23.4 kg/m².  Physical Exam  Vitals reviewed.   Constitutional:       Appearance: Normal appearance.   Cardiovascular:      Rate and Rhythm: Normal rate and regular rhythm.      Heart sounds: No murmur heard.  Pulmonary:      Effort: Pulmonary effort is normal.      Breath sounds: Normal breath sounds. No wheezing.   Neurological:      Mental Status: He is alert and oriented to person, place, and time.   Psychiatric:         Mood and Affect: Mood normal.         Behavior: Behavior normal.         Thought Content: Thought content normal.         Judgment: Judgment normal.             {Labs  Result Review  Imaging  Med Tab  Media  Procedures :23}     Patient or patient representative verbalized consent for the use of Ambient Listening during the visit with  Marjorie Villa MD for chart documentation. 6/30/2025  10:08 EDT

## 2025-06-30 NOTE — ASSESSMENT & PLAN NOTE
{Hyperlipidemia A/P Block (Optional):[id number]}    Orders:    Lipid Panel With LDL / HDL Ratio

## 2025-07-12 LAB
CHOLEST SERPL-MCNC: 162 MG/DL (ref 100–199)
HDLC SERPL-MCNC: 50 MG/DL
LDLC SERPL CALC-MCNC: 99 MG/DL (ref 0–99)
LDLC/HDLC SERPL: 2 RATIO (ref 0–3.6)
TRIGL SERPL-MCNC: 68 MG/DL (ref 0–149)
VLDLC SERPL CALC-MCNC: 13 MG/DL (ref 5–40)

## 2025-07-14 LAB
CHOLEST SERPL-MCNC: 182 MG/DL (ref 100–199)
HDL SERPL-SCNC: 33.6 UMOL/L
HDLC SERPL-MCNC: 55 MG/DL
LDL SERPL QN: 21 NM
LDL SERPL-SCNC: 1371 NMOL/L
LDL SMALL SERPL-SCNC: 582 NMOL/L
LDLC SERPL CALC-MCNC: 114 MG/DL (ref 0–99)
TRIGL SERPL-MCNC: 68 MG/DL (ref 0–149)

## 2025-07-15 ENCOUNTER — PATIENT MESSAGE (OUTPATIENT)
Dept: FAMILY MEDICINE CLINIC | Facility: CLINIC | Age: 70
End: 2025-07-15
Payer: MEDICARE

## 2025-07-15 DIAGNOSIS — Z78.9 STATIN INTOLERANCE: ICD-10-CM

## 2025-07-15 DIAGNOSIS — E78.2 MIXED HYPERLIPIDEMIA: ICD-10-CM

## 2025-07-15 DIAGNOSIS — Z82.49 FAMILY HISTORY OF EARLY CAD: ICD-10-CM

## 2025-07-15 DIAGNOSIS — R93.1 AGATSTON CORONARY ARTERY CALCIUM SCORE GREATER THAN 400: Primary | ICD-10-CM

## 2025-07-18 RX ORDER — EVOLOCUMAB 140 MG/ML
140 INJECTION, SOLUTION SUBCUTANEOUS
Qty: 2 ML | Refills: 5 | Status: SHIPPED | OUTPATIENT
Start: 2025-07-18 | End: 2025-07-24

## 2025-07-20 DIAGNOSIS — M54.50 CHRONIC BILATERAL LOW BACK PAIN WITHOUT SCIATICA: ICD-10-CM

## 2025-07-20 DIAGNOSIS — G89.29 CHRONIC BILATERAL LOW BACK PAIN WITHOUT SCIATICA: ICD-10-CM

## 2025-07-22 DIAGNOSIS — F51.01 PRIMARY INSOMNIA: ICD-10-CM

## 2025-07-22 RX ORDER — DARIDOREXANT 25 MG/1
1 TABLET, FILM COATED ORAL EVERY EVENING
Qty: 25 TABLET | Refills: 0 | Status: SHIPPED | OUTPATIENT
Start: 2025-07-22

## 2025-08-04 ENCOUNTER — OFFICE VISIT (OUTPATIENT)
Dept: NEUROLOGY | Facility: CLINIC | Age: 70
End: 2025-08-04
Payer: MEDICARE

## 2025-08-04 VITALS
BODY MASS INDEX: 22.94 KG/M2 | HEART RATE: 64 BPM | SYSTOLIC BLOOD PRESSURE: 128 MMHG | WEIGHT: 169.4 LBS | DIASTOLIC BLOOD PRESSURE: 86 MMHG | HEIGHT: 72 IN | OXYGEN SATURATION: 97 %

## 2025-08-04 DIAGNOSIS — Z86.73 HISTORY OF ISCHEMIC STROKE: Primary | ICD-10-CM

## 2025-08-04 PROCEDURE — 3079F DIAST BP 80-89 MM HG: CPT | Performed by: PSYCHIATRY & NEUROLOGY

## 2025-08-04 PROCEDURE — 3074F SYST BP LT 130 MM HG: CPT | Performed by: PSYCHIATRY & NEUROLOGY

## 2025-08-04 PROCEDURE — 99213 OFFICE O/P EST LOW 20 MIN: CPT | Performed by: PSYCHIATRY & NEUROLOGY

## 2025-08-12 RX ORDER — LOSARTAN POTASSIUM 50 MG/1
75 TABLET ORAL DAILY
Qty: 135 TABLET | Refills: 1 | OUTPATIENT
Start: 2025-08-12

## 2025-08-21 ENCOUNTER — EXTERNAL PBMM DATA (OUTPATIENT)
Dept: PHARMACY | Facility: OTHER | Age: 70
End: 2025-08-21
Payer: MEDICARE

## (undated) DEVICE — ADAPT CLN BIOGUARD AIR/H2O DISP

## (undated) DEVICE — THE SINGLE USE ETRAP – POLYP TRAP IS USED FOR SUCTION RETRIEVAL OF ENDOSCOPICALLY REMOVED POLYPS.: Brand: ETRAP

## (undated) DEVICE — SENSR O2 OXIMAX FNGR A/ 18IN NONSTR

## (undated) DEVICE — LN SMPL CO2 SHTRM SD STREAM W/M LUER

## (undated) DEVICE — SNAR POLYP SENSATION STDOVL 27 240 BX40

## (undated) DEVICE — KT ORCA ORCAPOD DISP STRL

## (undated) DEVICE — TUBING, SUCTION, 1/4" X 10', STRAIGHT: Brand: MEDLINE

## (undated) DEVICE — CANN O2 ETCO2 FITS ALL CONN CO2 SMPL A/ 7IN DISP LF

## (undated) DEVICE — SINGLE-USE BIOPSY FORCEPS: Brand: RADIAL JAW 4